# Patient Record
Sex: MALE | Race: BLACK OR AFRICAN AMERICAN | NOT HISPANIC OR LATINO | Employment: FULL TIME | URBAN - METROPOLITAN AREA
[De-identification: names, ages, dates, MRNs, and addresses within clinical notes are randomized per-mention and may not be internally consistent; named-entity substitution may affect disease eponyms.]

---

## 2023-09-30 PROBLEM — M54.81 OCCIPITAL NEURITIS: Status: ACTIVE | Noted: 2023-09-30

## 2023-09-30 PROBLEM — M99.03 SEGMENTAL AND SOMATIC DYSFUNCTION OF LUMBAR REGION: Status: ACTIVE | Noted: 2023-09-30

## 2023-09-30 PROBLEM — M54.2 NECK PAIN, CHRONIC: Status: ACTIVE | Noted: 2023-09-30

## 2023-09-30 PROBLEM — M99.02 SEGMENTAL AND SOMATIC DYSFUNCTION OF THORACIC REGION: Status: ACTIVE | Noted: 2023-09-30

## 2023-09-30 PROBLEM — M54.41 RIGHT-SIDED LOW BACK PAIN WITH SCIATICA: Status: ACTIVE | Noted: 2023-09-30

## 2023-09-30 PROBLEM — M99.05 SEGMENTAL AND SOMATIC DYSFUNCTION OF PELVIC REGION: Status: ACTIVE | Noted: 2023-09-30

## 2023-09-30 PROBLEM — J32.4 CHRONIC PANSINUSITIS: Status: ACTIVE | Noted: 2023-09-30

## 2023-09-30 PROBLEM — G25.89 SCAPULAR DYSKINESIS: Status: ACTIVE | Noted: 2023-09-30

## 2023-09-30 PROBLEM — G89.29 NECK PAIN, CHRONIC: Status: ACTIVE | Noted: 2023-09-30

## 2023-09-30 PROBLEM — M22.2X1 PATELLOFEMORAL PAIN SYNDROME OF RIGHT KNEE: Status: ACTIVE | Noted: 2023-09-30

## 2023-09-30 PROBLEM — R20.2 TINGLING OF FACE: Status: ACTIVE | Noted: 2023-09-30

## 2023-09-30 PROBLEM — J30.89 ENVIRONMENTAL AND SEASONAL ALLERGIES: Status: ACTIVE | Noted: 2023-09-30

## 2023-09-30 PROBLEM — R20.2 TINGLING SENSATION: Status: ACTIVE | Noted: 2023-09-30

## 2023-09-30 RX ORDER — LORATADINE 10 MG/1
10 TABLET ORAL DAILY
COMMUNITY

## 2023-09-30 RX ORDER — DICLOFENAC SODIUM 10 MG/G
GEL TOPICAL
COMMUNITY

## 2023-09-30 RX ORDER — TOPIRAMATE 25 MG/1
TABLET ORAL
COMMUNITY

## 2023-09-30 RX ORDER — MELOXICAM 7.5 MG/1
7.5 TABLET ORAL DAILY
COMMUNITY

## 2023-10-04 ENCOUNTER — APPOINTMENT (OUTPATIENT)
Dept: INTEGRATIVE MEDICINE | Facility: CLINIC | Age: 28
End: 2023-10-04
Payer: COMMERCIAL

## 2023-10-05 ENCOUNTER — APPOINTMENT (OUTPATIENT)
Dept: PHYSICAL THERAPY | Facility: HOSPITAL | Age: 28
End: 2023-10-05
Payer: COMMERCIAL

## 2023-10-11 ENCOUNTER — APPOINTMENT (OUTPATIENT)
Dept: INTEGRATIVE MEDICINE | Facility: CLINIC | Age: 28
End: 2023-10-11
Payer: COMMERCIAL

## 2023-10-16 ENCOUNTER — APPOINTMENT (OUTPATIENT)
Dept: PHYSICAL THERAPY | Facility: HOSPITAL | Age: 28
End: 2023-10-16
Payer: COMMERCIAL

## 2023-10-16 NOTE — PROGRESS NOTES
Physical Therapy  Physical Therapy Treatment Note    Patient Name: Patrick Rey  MRN: [id number]  Today's Date: 10/16/2023       Insurance:  Visit number: 6 of 8  Authorization info: 10/17/23  Insurance Type: 20 visit limit, very hard to get additional visits authorized    General:  Reason for visit: Patrick is a 28yr old M presenting to the clinic with R knee pain  Referred by: Dr. Morgan    Current Problem  1. Patellofemoral pain syndrome of right knee            Precautions: None      Subjective:     Patient reports ***    Pain       Performing HEP?: Yes      Objective:   AROM: Knee  L 0-130 degrees  R 0-125 degrees     AROM: Hip  Flexion WNL B  IR WNL B  ER WNL B      Flexibility: (L/R)  Hamstring 90/90 (35) / (40)  Gastroc 0 / (5)     Strength: (L/R)  Rectus femoris 5/5 / 5/5  Psoas 4-5 / 4-5  IR 5/5 / 5/5  ER 5/5 / 4+/5  Iliopsoas 5/5 / 4+/5  TFL 4+/5 / 5/5  Glut min 4+/5 / 4+/5  Glut med 4+/5 / 4/5  Glut max 5/5 / 5/5  Hamstrings 5/5 / 5/5      Treatment Performed:    Therapeutic Exercise:    *** min  ***    Manual Therapy:    *** min  ***    Neuromuscular Re-education:  *** min  ***    Other:     *** min  ***      Assessment:   ***      Plan:  ***      Elida Alvarenga, PT

## 2023-10-18 ENCOUNTER — APPOINTMENT (OUTPATIENT)
Dept: INTEGRATIVE MEDICINE | Facility: CLINIC | Age: 28
End: 2023-10-18
Payer: COMMERCIAL

## 2023-10-23 ENCOUNTER — APPOINTMENT (OUTPATIENT)
Dept: INTEGRATIVE MEDICINE | Facility: CLINIC | Age: 28
End: 2023-10-23
Payer: COMMERCIAL

## 2023-10-25 ENCOUNTER — APPOINTMENT (OUTPATIENT)
Dept: PHYSICAL THERAPY | Facility: HOSPITAL | Age: 28
End: 2023-10-25
Payer: COMMERCIAL

## 2023-10-25 ENCOUNTER — ALLIED HEALTH (OUTPATIENT)
Dept: INTEGRATIVE MEDICINE | Facility: CLINIC | Age: 28
End: 2023-10-25
Payer: COMMERCIAL

## 2023-10-25 DIAGNOSIS — G89.29 CHRONIC THORACIC BACK PAIN, UNSPECIFIED BACK PAIN LATERALITY: ICD-10-CM

## 2023-10-25 DIAGNOSIS — S16.1XXA STRAIN OF CERVICAL PORTION OF RIGHT TRAPEZIUS MUSCLE: ICD-10-CM

## 2023-10-25 DIAGNOSIS — G25.89 SCAPULAR DYSKINESIS: ICD-10-CM

## 2023-10-25 DIAGNOSIS — M99.03 SEGMENTAL AND SOMATIC DYSFUNCTION OF LUMBAR REGION: ICD-10-CM

## 2023-10-25 DIAGNOSIS — M54.6 CHRONIC THORACIC BACK PAIN, UNSPECIFIED BACK PAIN LATERALITY: ICD-10-CM

## 2023-10-25 DIAGNOSIS — M99.05 SEGMENTAL AND SOMATIC DYSFUNCTION OF PELVIC REGION: ICD-10-CM

## 2023-10-25 DIAGNOSIS — M99.02 SEGMENTAL AND SOMATIC DYSFUNCTION OF THORACIC REGION: Primary | ICD-10-CM

## 2023-10-25 PROCEDURE — 98941 CHIROPRACT MANJ 3-4 REGIONS: CPT | Performed by: CHIROPRACTOR

## 2023-10-25 NOTE — PROGRESS NOTES
Subjective   Patient ID: Patrick Rey is a 28 y.o. male who presents October 25, 2023 for chiropractic care.    7/12 VPCY    Today, the patient rates their degree of pain as a 4 out of 10 on the numeric pain rating scale.     HPI : Patrick returns to my office for care of left mid/lower back tension and right upper back discomfort. He denies new trauma/incident. Reports he was sick for a few weeks recently but is feeling better overall. Has not been able to work out as frequently as a result. He reports feeling discomfort and tension in the region of the right upper trapezius and shoulder blade. He notes discomfort along the left thoracolumbar region. Feels knee discomfort has improved overall with physical therapy. Denies new trauma/incident.      Objective   Physical Exam  Neurological:      General: No focal deficit present.      Mental Status: He is alert and oriented to person, place, and time.      Cranial Nerves: No dysarthria or facial asymmetry.      Sensory: Sensation is intact.      Motor: Motor function is intact.      Coordination: Coordination is intact.      Gait: Gait is intact.         Palpation of the following region(s) revealed:  Cervical: Upper trapezius right, hypertonicity and tenderness.  Levator scapulae right, hypertonicity and tenderness.  Thoracic: Thoracic paraspinals left, hypertonicity and tenderness.  Middle trapezius right, muscular hypertonicity.  Latissimus dorsi left, muscular hypertonicity.  Lumbar: Lumbar paraspinals bilateral, muscular hypertonicity.  Quadratus lumborum bilateral, muscular hypertonicity.        Segmental Joint(s): Segmental joint dysfunction was assessed with motion palpation and is identified in the following areas:  Thoracic : T2., T3, T7, T11, and T12  Lumbopelvic / Sacral SIJ : L1, L5/S1, and R SIJ      Assessment/Plan   Today's Treatment Included: Chiropractic manipulation to the  Segmental Joint(s) Lumbopelvic/Sacral SIJ : L1, L5/S1, and R SIJ  Segmental Joint(s) Thoracic : T2., T3, T7, T11, and T12   Treatment Techniques Used : Activator/Tool assisted technique and Pelvic drop table technique  IASTM/Graston: R upper trap, levator; L thoracolumbar PS  IDN: R upper trap (1 needle in/1 out)    Soft-tissue mobilization was performed in the following areas:   Upper Trapezius right and Levator Scap. right  Thoracic Paraspinal mm. left, Middle Trapezius right, and Rhomboids right  Lumbar Paraspinal mm. bilateral and Quadratus Lumborum bilateral            F/U in 1 month    The patient tolerated today's treatment with little or no additional discomfort and was instructed to contact the office for questions or concerns.

## 2023-11-14 NOTE — PROGRESS NOTES
Subjective   Patient ID: Patrick Rey is a 28 y.o. male who presents November 15, 2023 for chiropractic care.    7/12 VPCY    Today, the patient rates their degree of pain as a 5 out of 10 on the numeric pain rating scale.     HPI : Patrick returns to my office for care of mid/lower back tension and upper back discomfort. He denies new trauma/incident. Reports that he was feeling increased tightness throughout the thoracolumbar region last week. 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 States that during the wedding he felt abrupt tightening and discomfort along the midline of the thoracolumbar region. States that the discomfort lasted for a period of time and then gradually improved. He also reports tightness in the bilateral upper trapezius regions. No radicular complaints reported.       Objective   Physical Exam  Neurological:      General: No focal deficit present.      Mental Status: He is alert and oriented to person, place, and time.      Cranial Nerves: No dysarthria or facial asymmetry.      Sensory: Sensation is intact.      Motor: Motor function is intact.      Coordination: Coordination is intact.      Gait: Gait is intact.         Palpation of the following region(s) revealed:  Cervical: Upper trapezius right, hypertonicity and tenderness.  Levator scapulae right, hypertonicity and tenderness.  Thoracic: Thoracic paraspinals left, hypertonicity and tenderness.  Middle trapezius right, muscular hypertonicity.  Latissimus dorsi left, muscular hypertonicity.  Lumbar: Lumbar paraspinals bilateral, muscular hypertonicity.  Quadratus lumborum bilateral, muscular hypertonicity.        Segmental Joint(s): Segmental joint dysfunction was assessed with motion palpation and is identified in the following areas:  Thoracic : T6, T7, T10, T11, and T12  Lumbopelvic / Sacral SIJ : L1, L5/S1, and R SIJ      Assessment/Plan   Today's Treatment Included:  Chiropractic manipulation to the  Segmental Joint(s) Lumbopelvic/Sacral SIJ : L1, L5, and L5/S1 Segmental Joint(s) Thoracic : T6, T7, T10, T11, and T12   Treatment Techniques Used : Activator/Tool assisted technique and Pelvic drop table technique  IASTM/Graston: BL upper trap, levator; BL thoracolumbar PS  IDN: BL upper trap, T11-L1 PS (6 needle in/6 out)    Soft-tissue mobilization was performed in the following areas:   Upper Trapezius right and Levator Scap. right  Thoracic Paraspinal mm. left, Middle Trapezius right, and Rhomboids right  Lumbar Paraspinal mm. bilateral and Quadratus Lumborum bilateral  IC to thoracolumbar PS      F/U in 1 month    The patient tolerated today's treatment with little or no additional discomfort and was instructed to contact the office for questions or concerns.

## 2023-11-15 ENCOUNTER — ALLIED HEALTH (OUTPATIENT)
Dept: INTEGRATIVE MEDICINE | Facility: CLINIC | Age: 28
End: 2023-11-15
Payer: COMMERCIAL

## 2023-11-15 DIAGNOSIS — G89.29 CHRONIC MIDLINE THORACIC BACK PAIN: ICD-10-CM

## 2023-11-15 DIAGNOSIS — G25.89 SCAPULAR DYSKINESIS: ICD-10-CM

## 2023-11-15 DIAGNOSIS — M54.6 CHRONIC MIDLINE THORACIC BACK PAIN: ICD-10-CM

## 2023-11-15 DIAGNOSIS — M99.05 SEGMENTAL AND SOMATIC DYSFUNCTION OF PELVIC REGION: ICD-10-CM

## 2023-11-15 DIAGNOSIS — M99.02 SEGMENTAL AND SOMATIC DYSFUNCTION OF THORACIC REGION: Primary | ICD-10-CM

## 2023-11-15 DIAGNOSIS — M99.03 SEGMENTAL AND SOMATIC DYSFUNCTION OF LUMBAR REGION: ICD-10-CM

## 2023-11-15 PROCEDURE — 98941 CHIROPRACT MANJ 3-4 REGIONS: CPT | Performed by: CHIROPRACTOR

## 2023-11-28 ENCOUNTER — DOCUMENTATION (OUTPATIENT)
Dept: INTEGRATIVE MEDICINE | Facility: CLINIC | Age: 28
End: 2023-11-28
Payer: COMMERCIAL

## 2023-11-28 ENCOUNTER — TELEPHONE (OUTPATIENT)
Dept: INTEGRATIVE MEDICINE | Facility: CLINIC | Age: 28
End: 2023-11-28
Payer: COMMERCIAL

## 2023-11-28 DIAGNOSIS — R20.2 TINGLING SENSATION: Primary | ICD-10-CM

## 2023-11-28 DIAGNOSIS — M54.81 OCCIPITAL NEURITIS: ICD-10-CM

## 2023-11-28 NOTE — TELEPHONE ENCOUNTER
"Patrick contacted me this morning via email for orthopedics referral. I called the patient and we spoke over the phone at about 10:30 am. He states that 4 days ago he was seen by his outside chiropractor and states they tried a new therapy called \"RPM - volt treatment\". He reports that this therapy felt similar to ultrasound (with use of gel) but also with a shock-like sensation. They started the therapy first along his trapezius region and then moved up toward the base of the skull. He asked them to turn down the intensity due to the sensation. He reports that after this therapy, he felt weird. Notes experiencing tingling in the hands and feet and felt his neck was \"floating\". This has persisted over the past 4 days. Patient looking to be evaluated through orthopedics due to his history of C1-2 ACDF at 7 years old. I am in agreement and the referral has been placed.  "

## 2023-11-29 ENCOUNTER — OFFICE VISIT (OUTPATIENT)
Dept: ORTHOPEDIC SURGERY | Facility: CLINIC | Age: 28
End: 2023-11-29
Payer: COMMERCIAL

## 2023-11-29 ENCOUNTER — ANCILLARY PROCEDURE (OUTPATIENT)
Dept: RADIOLOGY | Facility: CLINIC | Age: 28
End: 2023-11-29
Payer: COMMERCIAL

## 2023-11-29 DIAGNOSIS — S13.9XXA CERVICAL SPRAIN: Primary | ICD-10-CM

## 2023-11-29 DIAGNOSIS — S13.9XXA CERVICAL SPRAIN: ICD-10-CM

## 2023-11-29 PROCEDURE — 72050 X-RAY EXAM NECK SPINE 4/5VWS: CPT | Performed by: RADIOLOGY

## 2023-11-29 PROCEDURE — 72050 X-RAY EXAM NECK SPINE 4/5VWS: CPT | Mod: FY

## 2023-11-29 PROCEDURE — 99203 OFFICE O/P NEW LOW 30 MIN: CPT | Performed by: PHYSICIAN ASSISTANT

## 2023-11-29 NOTE — PROGRESS NOTES
Patrick is a 28-year-old male that presents today to be evaluated for his acute onset of axial pain.  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    Patient states that he went to a chiropractor down in [place] at [facility] and normally he goes in for realignment's and treatment but there was a student or assistant that was working that day and she used a RPM gun and went right at the site of his previous cervical fusion.  He states since then he has had very adverse side effects.  He feels very off, radicular symptoms both in the cervical and lumbar spine.  This visit was on a Friday.  He then drove this past Saturday to [place] to visit family and the symptoms are still concerning that then he went to the emergency room while he was there.  While he was there they did obtain x-rays and reassured him that nothing was wrong, there was no hardware malfunction no compression fractures and no instability.  They prescribed him prednisone and a muscle relaxer and follow-up with orthopedics.    Once the patient returned back to [facility] he was hoping that symptoms would get better but they were still pretty persistent.  Today he states that he is still dealing with axial neck pain specifically up at the C2-C3 level.  He has very sporadic radiculopathy with numbness and tingling predominantly throughout the bilateral area.  He is ambulating without any assistance or difficulty.  Full control of his bowel and bladder.    From a treatment standpoint he is only been taking the prednisone and muscle relaxer.  No formal physical therapy but he does follow-up with the chiropractic office on a regular basis as well as [facility] for massages.  He is scheduled to see both of them in the next week or so.      We did review x-rays that he had taken today.  As well as compared them to x-rays that he had done at the earlier portion of the year.  The results are extremely similar.  No  malalignment no failure no concerning findings.    I encouraged the patient to be patient with this.  This is going to take some time.  I encouraged anti-inflammatories and Tylenol.  He denies as well as continuing with the stretching and exercise program.  If symptoms do not improve in the next 1-2 weeks he will call the office.     This note was dictated using speech recognition software and was not corrected for spelling or grammatical errors

## 2023-12-05 NOTE — PROGRESS NOTES
"Subjective   Patient ID: Patrick Rey is a 28 y.o. male who presents December 6, 2023 for chiropractic care.    8/12 VPCY    Today, the patient rates their degree of pain as a 6 out of 10 on the numeric pain rating scale.     HPI : Patrick returns to my office for care of recurrent mid/lower back tension along with new onset of neck discomfort and altered sensation. He states that new onset symptoms began about 1.5 weeks ago after going to his other chiropractic office in [place] for care.  He reports that an assistant or student used an RPM device that felt similar to both ultrasound with a \"volt\" sensation. He states that this was helpful in his trapezius region but they then moved up toward the base of the skull and previous cervical surgical site. He reports feeling that he could not speak for a short moment during this treatment. Since then, he has been experiencing adverse effects included altered sensation in the hands and feet. He reports discomfort along the base of the skull and feels his head is \"floating\". He has been able to continue to work and move without difficulty, but feels \"off\". No changes in bowel/bladder function. Was seen by orthopedics on 11/29 and x-rays were reassuring. No other changes in health noted.    Objective   Physical Exam  Neurological:      General: No focal deficit present.      Mental Status: He is alert and oriented to person, place, and time.      Cranial Nerves: No dysarthria or facial asymmetry.      Sensory: Sensation is intact.      Motor: Motor function is intact.      Coordination: Coordination is intact.      Gait: Gait is intact.         Palpation of the following region(s) revealed:  Cervical: Suboccipitals bilateral, hypertonicity and tenderness.  Upper trapezius bilateral, hypertonicity and tenderness.  Levator scapulae bilateral, muscular hypertonicity.  Thoracic: Thoracic paraspinals left, muscular hypertonicity.  Middle trapezius bilateral, muscular " hypertonicity.  Latissimus dorsi bilateral, muscular hypertonicity.  Lumbar: Lumbar paraspinals bilateral, muscular hypertonicity.  Quadratus lumborum bilateral, muscular hypertonicity.        Segmental Joint(s): Segmental joint dysfunction was assessed with motion palpation and is identified in the following areas:  Thoracic : T4, T5, T10, T11, and T12  Lumbopelvic / Sacral SIJ : L1, L5, and R SIJ      Assessment/Plan   Today's Treatment Included: Chiropractic manipulation to the  Segmental Joint(s) Lumbopelvic/Sacral SIJ : L1, L4, L5/S1, and R SIJ Segmental Joint(s) Thoracic : T4, T5, T10, T11, and T12   Treatment Techniques Used : Activator/Tool assisted technique, Pelvic drop table technique, and Low force  Neuromuscular Re-Education (47240): Start time: 11:20 am End time: 11:35 am  1 Units  IASTM/Graston: BL thoracolumbar PS    Soft-tissue mobilization was performed in the following areas:   Upper Trapezius right and Levator Scap. right  Thoracic Paraspinal mm. left, Middle Trapezius right, and Rhomboids right  Lumbar Paraspinal mm. bilateral and Quadratus Lumborum bilateral  Additional STM: pec major/minor, suboccipitals  IC to thoracolumbar PS      F/U in 1 month    The patient tolerated today's treatment with little or no additional discomfort and was instructed to contact the office for questions or concerns.

## 2023-12-06 ENCOUNTER — ALLIED HEALTH (OUTPATIENT)
Dept: INTEGRATIVE MEDICINE | Facility: CLINIC | Age: 28
End: 2023-12-06
Payer: COMMERCIAL

## 2023-12-06 DIAGNOSIS — G89.29 CHRONIC MIDLINE THORACIC BACK PAIN: ICD-10-CM

## 2023-12-06 DIAGNOSIS — M99.02 SEGMENTAL AND SOMATIC DYSFUNCTION OF THORACIC REGION: ICD-10-CM

## 2023-12-06 DIAGNOSIS — M99.03 SEGMENTAL AND SOMATIC DYSFUNCTION OF LUMBAR REGION: ICD-10-CM

## 2023-12-06 DIAGNOSIS — M54.6 CHRONIC MIDLINE THORACIC BACK PAIN: ICD-10-CM

## 2023-12-06 DIAGNOSIS — M99.05 SEGMENTAL AND SOMATIC DYSFUNCTION OF PELVIC REGION: ICD-10-CM

## 2023-12-06 DIAGNOSIS — R20.2 TINGLING SENSATION: Primary | ICD-10-CM

## 2023-12-06 DIAGNOSIS — S13.9XXA NECK SPRAIN, INITIAL ENCOUNTER: ICD-10-CM

## 2023-12-06 PROCEDURE — 98941 CHIROPRACT MANJ 3-4 REGIONS: CPT | Performed by: CHIROPRACTOR

## 2023-12-14 ENCOUNTER — OFFICE VISIT (OUTPATIENT)
Dept: ORTHOPEDIC SURGERY | Facility: HOSPITAL | Age: 28
End: 2023-12-14
Payer: COMMERCIAL

## 2023-12-14 VITALS — HEIGHT: 71 IN | WEIGHT: 165 LBS | BODY MASS INDEX: 23.1 KG/M2

## 2023-12-14 DIAGNOSIS — M54.81 OCCIPITAL NEURITIS: ICD-10-CM

## 2023-12-14 DIAGNOSIS — Z98.1 HISTORY OF FUSION OF CERVICAL SPINE: Primary | ICD-10-CM

## 2023-12-14 DIAGNOSIS — R20.2 TINGLING SENSATION: ICD-10-CM

## 2023-12-14 PROCEDURE — 99214 OFFICE O/P EST MOD 30 MIN: CPT | Performed by: ORTHOPAEDIC SURGERY

## 2023-12-14 NOTE — PROGRESS NOTES
Chief Complaint: Neck pain and paresthesias    HPI: Patrick Rey is a 28 y.o. 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  He states that about 4 years ago he has these chronic symptoms of paresthesias that radiates from the back of his head to his face and into his hands.  Over time with chiropractic treatments and therapy it has improved.  Currently his paresthesias are mainly in his hand along the ulnar aspect of his hand bilaterally they come and go.  He was doing fine up until recently about 3 weeks ago during chiropractic treatment he had some kind of treatment where he had electric shock on the back of his neck which cause worsening of his paresthesias.  No bowel or bladder disturbances or saddle anesthesia.  No extremity weakness.  The paresthesias seems to be slightly improving but still there.  He is undergone chiropractic treatments as well as massage therapy.      No anticoagulations  No tobacco use  He is a  employee and works in marketing.    Review of Systems    All other systems have been reviewed and are negative for complaint. All pertinent positive and negative as listed in history of present illness.    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     Spondylometaphyseal dysplasia    Personal history of other diseases of the musculoskeletal system and connective tissue     History of bone disorder        Past Surgical History:   Procedure Laterality Date    CT GUIDED PERCUTANEOUS BIOPSY BONE  10/27/2021    CT GUIDED PERCUTANEOUS BIOPSY BONE 10/27/2021        Allergies   Allergen Reactions    Ragweed Unknown        Current Outpatient Medications on File Prior to Visit   Medication Sig Dispense Refill    diclofenac sodium  (Arthritis Pain, diclofenac,) 1 % gel gel Apply sparingly to affected areas daily as needed      lidocaine 4 % kit Apply 1 patch topically once daily.      loratadine (Claritin) 10 mg tablet Take 1 tablet (10 mg) by mouth once daily.      meloxicam (Mobic) 7.5 mg tablet Take 1 tablet (7.5 mg) by mouth once daily. With food      topiramate (Topamax) 25 mg tablet Take per titration, fax to pharmacy       No current facility-administered medications on file prior to visit.            PE:   General: Patient appears well-nourished and well-developed in no acute distress, Alert and Oriented x3  Psych: Pleasant mood and affect  HEENT: Extraocular muscles intact, pupils equal and round. Sclerae anicteric   Cardio: extremities warm and well perfused  Resp: unlabored symmetric breathing  Skin: He has a well-healed posterior cervical incision midline  Musculoskeletal/Neuro Exam: Normal gait.  No tenderness to palpation along the cervical spine.  He actually has pretty good cervical range of motion despite the fusion.  Negative Spurlings.  Negative Lhermitte's Sign    Upper extremity: Negative Tinel over the cubital tunnel bilaterally.  Negative Concepcion and Durkan over the carpal tunnel bilaterally    Motor: Right upper extremity was 5 out of 5 strength with shoulder abduction, elbow flexion and extension, wrist flexion and extension and  against resistance  Left upper extremity with 5 out of 5 strength with shoulder abduction, elbow flexion and extension, wrist flexion and extension and  against resistance    Sensation to light touch intact along C5-T1 distribution bilaterally    Reflex: 2+ brachioradialis and biceps bilaterally    Upper Motor Signs: Negative Ermias sign bilaterally    Lower extremity    Motor: Right leg with 5 out of 5 motor strength with hip flexion, knee extension, ankle dorsiflexion plantarflexion EHL against resistance  Left leg with 5 out of 5 motor strength with hip flexion, knee extension,  ankle dorsiflexion plantarflexion EHL against resistance    Sensation to light touch intact along L2-S1 distribution bilaterally    2+ patella and Achilles Reflex          Imaging:    I reviewed x-rays of the cervical spine from November 29, 2023.  AP lateral flexion-extension views.  He has what looks like sublaminar wiring in his posterior C2 elements.  No instability with flexion extension no acute fractures.          A/P: Patrick Rey is a 28 y.o. year old male patient with history of posterior cervical fusion however on x-ray only sees sublaminar wiring in the posterior elements of C2.  He has done fine since his surgery but has been having symptoms of paresthesias in the back of his head and face for the past 4 years that slowly improving with therapy.  However more recently about 3 weeks ago during chiropractic treatment he had some kind of treatment where he felt like he was tased.  He developed worsening paresthesias in his hands mainly along the ulnar aspect as well as neck pain.  Given his continued neck pain despite conservative treatment of chiropractic treatment massage therapy I would like for him to get a CT scan of the cervical spine to assess his fusion and instrumentation.  If he continues to have the paresthesias in his hands at the next visit we can get a EMG.  He was advised to schedule appointment see me back after the CT scan is complete. After our discussion, the patient articulated understanding of the plan and felt that all questions had been answered satisfactorily. The patient was pleased with the visit and very appreciative for the care rendered.    **Please excuse any errors in grammar or translation related to this dictation. Voice recognition software was utilized to prepare this document. **                    Carina Mortensen MD    Department of Orthopaedic Surgery  [facility]  [email or web address]".[facility]

## 2023-12-18 ENCOUNTER — APPOINTMENT (OUTPATIENT)
Dept: ORTHOPEDIC SURGERY | Facility: CLINIC | Age: 28
End: 2023-12-18
Payer: COMMERCIAL

## 2023-12-20 ENCOUNTER — ALLIED HEALTH (OUTPATIENT)
Dept: INTEGRATIVE MEDICINE | Facility: CLINIC | Age: 28
End: 2023-12-20
Payer: COMMERCIAL

## 2023-12-20 DIAGNOSIS — M54.6 CHRONIC MIDLINE THORACIC BACK PAIN: ICD-10-CM

## 2023-12-20 DIAGNOSIS — M99.02 SEGMENTAL AND SOMATIC DYSFUNCTION OF THORACIC REGION: Primary | ICD-10-CM

## 2023-12-20 DIAGNOSIS — M99.03 SEGMENTAL AND SOMATIC DYSFUNCTION OF LUMBAR REGION: ICD-10-CM

## 2023-12-20 DIAGNOSIS — G89.29 CHRONIC MIDLINE THORACIC BACK PAIN: ICD-10-CM

## 2023-12-20 DIAGNOSIS — M99.05 SEGMENTAL AND SOMATIC DYSFUNCTION OF PELVIC REGION: ICD-10-CM

## 2023-12-20 PROCEDURE — 98941 CHIROPRACT MANJ 3-4 REGIONS: CPT | Performed by: CHIROPRACTOR

## 2023-12-20 NOTE — PROGRESS NOTES
Acupuncture Visit:     Subjective   Patient ID: Patrick Rey is a 28 y.o. male who presents for No chief complaint on file.  HPI              Review of Systems         Provider reviewed plan for the acupuncture session, precautions and contraindications. Patient/guardian/hospital staff has given consent to treat with full understanding of what to expect during the session. Before acupuncture began, provider explained to the patient to communicate at any time if the procedure was causing discomfort past their tolerance level. Patient agreed to advise acupuncturist. The acupuncturist counseled the patient on the risks of acupuncture treatment including pain, infection, bleeding, and no relief of pain. The patient was positioned comfortably. There was no evidence of infection at the site of needle insertions.    Objective   Physical Exam                             Assessment/Plan   {Assess/PlanSmartLinks:64589}

## 2023-12-20 NOTE — PROGRESS NOTES
Subjective   Patient ID: Patrick Rey is a 28 y.o. male who presents December 20, 2023 for chiropractic care.    9/12 VPCY    Today, the patient rates their degree of pain as a 3 out of 10 on the numeric pain rating scale.     HPI : Patrick returns to my office for care of recurrent mid/lower back tension along with neck tension. Since the time of his last visit he was evaluated by orthopedics following cervical symptoms began after going to his other chiropractic office in [place] for care including tingling and stiffness. A cervical CT order was placed which he will be completing in early January. He does report overall improvement in symptoms since his last visit. He does report continued stiffness along the base of the neck, particularly when rotating the head when sleeping. 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 No other changes in health noted.    Objective   Physical Exam  Neurological:      General: No focal deficit present.      Mental Status: He is alert and oriented to person, place, and time.      Cranial Nerves: No dysarthria or facial asymmetry.      Sensory: Sensation is intact.      Motor: Motor function is intact.      Coordination: Coordination is intact.      Gait: Gait is intact.         Palpation of the following region(s) revealed:  Cervical: Suboccipitals bilateral, hypertonicity and tenderness.  Upper trapezius bilateral, muscular hypertonicity.  Levator scapulae bilateral, muscular hypertonicity.  Thoracic: Thoracic paraspinals left, muscular hypertonicity.  Middle trapezius bilateral, muscular hypertonicity.  Latissimus dorsi bilateral, muscular hypertonicity.  Lumbar: Lumbar paraspinals bilateral, muscular hypertonicity.  Quadratus lumborum bilateral, muscular hypertonicity.        Segmental Joint(s): Segmental joint dysfunction was assessed with motion palpation and is identified in the following areas:  Thoracic :  T4, T5, T10, T11, and T12  Lumbopelvic / Sacral SIJ : L1, L5, and R SIJ      Assessment/Plan   Today's Treatment Included: Chiropractic manipulation to the  Segmental Joint(s) Lumbopelvic/Sacral SIJ : L1, L4, L5/S1, and R SIJ Segmental Joint(s) Thoracic : T4, T5, T10, T11, and T12   Treatment Techniques Used : Activator/Tool assisted technique, Pelvic drop table technique, and Low force  Neuromuscular Re-Education (36953): Start time: 12:20 am End time: 12:35 am  1 Units  IASTM/Graston: BL thoracolumbar PS    Soft-tissue mobilization was performed in the following areas:   Cervical paraspinal mm. bilateral, Upper Trapezius right, and Levator Scap. bilateral  Thoracic Paraspinal mm. bilateral, Middle Trapezius bilateral, and Rhomboids bilateral  Lumbar Paraspinal mm. bilateral and Quadratus Lumborum bilateral  Additional STM: pec major/minor, suboccipitals    IC to thoracolumbar PS      F/U in 1 month    The patient tolerated today's treatment with little or no additional discomfort and was instructed to contact the office for questions or concerns.

## 2024-01-05 ENCOUNTER — HOSPITAL ENCOUNTER (OUTPATIENT)
Dept: RADIOLOGY | Facility: HOSPITAL | Age: 29
Discharge: HOME | End: 2024-01-05
Payer: COMMERCIAL

## 2024-01-05 DIAGNOSIS — M54.81 OCCIPITAL NEURITIS: ICD-10-CM

## 2024-01-05 DIAGNOSIS — Z98.1 HISTORY OF FUSION OF CERVICAL SPINE: ICD-10-CM

## 2024-01-05 PROCEDURE — 72125 CT NECK SPINE W/O DYE: CPT

## 2024-01-05 PROCEDURE — 72125 CT NECK SPINE W/O DYE: CPT | Performed by: RADIOLOGY

## 2024-01-09 ENCOUNTER — APPOINTMENT (OUTPATIENT)
Dept: INTEGRATIVE MEDICINE | Facility: CLINIC | Age: 29
End: 2024-01-09
Payer: COMMERCIAL

## 2024-01-23 ENCOUNTER — APPOINTMENT (OUTPATIENT)
Dept: INTEGRATIVE MEDICINE | Facility: CLINIC | Age: 29
End: 2024-01-23
Payer: COMMERCIAL

## 2024-01-23 ENCOUNTER — ALLIED HEALTH (OUTPATIENT)
Dept: INTEGRATIVE MEDICINE | Facility: CLINIC | Age: 29
End: 2024-01-23
Payer: COMMERCIAL

## 2024-01-23 DIAGNOSIS — G89.29 CHRONIC MIDLINE THORACIC BACK PAIN: ICD-10-CM

## 2024-01-23 DIAGNOSIS — M54.2 NECK PAIN, CHRONIC: ICD-10-CM

## 2024-01-23 DIAGNOSIS — M99.03 SEGMENTAL AND SOMATIC DYSFUNCTION OF LUMBAR REGION: ICD-10-CM

## 2024-01-23 DIAGNOSIS — M99.02 SEGMENTAL AND SOMATIC DYSFUNCTION OF THORACIC REGION: Primary | ICD-10-CM

## 2024-01-23 DIAGNOSIS — M54.6 CHRONIC MIDLINE THORACIC BACK PAIN: ICD-10-CM

## 2024-01-23 DIAGNOSIS — M99.05 SEGMENTAL AND SOMATIC DYSFUNCTION OF PELVIC REGION: ICD-10-CM

## 2024-01-23 DIAGNOSIS — G89.29 NECK PAIN, CHRONIC: ICD-10-CM

## 2024-01-23 PROCEDURE — 97112 NEUROMUSCULAR REEDUCATION: CPT | Performed by: CHIROPRACTOR

## 2024-01-23 NOTE — PROGRESS NOTES
Subjective   Patient ID: Patrick Rey is a 28 y.o. male who presents January 23, 2024 for chiropractic care.    (0/12) [facility].  (1/20) [facility] [id number]    Today, the patient rates their degree of pain as a 3 out of 10 on the numeric pain rating scale.     Patrick returns for continued chiropractic care. He states that he has been doing well. Today, he presents with some discomfort in the neck and shoulders due to work. However, he states that his low back has been feeling great! The patient denies any changes in health since his last encounter and will return in 1 month.      Objective   Physical Exam  Neurological:      General: No focal deficit present.      Mental Status: He is alert and oriented to person, place, and time.      Cranial Nerves: No dysarthria or facial asymmetry.      Sensory: Sensation is intact.      Motor: Motor function is intact.      Coordination: Coordination is intact.      Gait: Gait is intact.       Palpation of the following region(s) revealed:  Cervical: Scalenes bilateral, muscular hypertonicity.  Upper trapezius bilateral, muscular hypertonicity.  Levator scapulae bilateral, muscular hypertonicity.  Cervical paraspinals bilateral, muscular hypertonicity.  Thoracic: Thoracic paraspinals bilateral, muscular hypertonicity.  Rhomboids bilateral, muscular hypertonicity.  Pectoralis bilateral, muscular hypertonicity.  Latissimus dorsi bilateral, muscular hypertonicity.  Lumbar: Lumbar paraspinals bilateral, muscular hypertonicity.  Gluteal bilateral, muscular hypertonicity.              Assessment/Plan   Today's Treatment Included: Neuromuscular Re-Education (66129): Start time: 9:45 am End time: 10:15 am  2 Units  Pin and stretch    Soft-tissue mobilization was performed in the following areas:   Suboccipital bilateral, Cervical paraspinal mm. bilateral, Scalenes bilateral, Upper Trapezius bilateral, and Levator Scap. bilateral  Thoracic Paraspinal mm. bilateral, Middle Trapezius  bilateral, Rhomboids bilateral, Pectoralis bilateral, Subscapularis bilateral, and Latissimus Dorsi bilateral  Lumbar Paraspinal mm. bilateral, Quadratus Lumborum bilateral, Gluteal mm. Glute. Max.  and Glute. Med. bilateral, and Piriformis bilateral            The patient tolerated today's treatment with little or no additional discomfort and was instructed to contact the office for questions or concerns.

## 2024-03-04 NOTE — PROGRESS NOTES
Subjective   Patient ID: Patrick Rey is a 28 y.o. male who presents March 5, 2024 for chiropractic care.    (0/12) [facility].  (4/20) [facility] [address]    Today, the patient rates their degree of pain as a 3 out of 10 on the numeric pain rating scale.     Patrick returns for continued chiropractic care. He states that he has been doing well. Today, he presents with tension in the lower thoracic from work. The patient denies any changes in health since his last encounter and will return in 1 month.      Objective   Physical Exam  Neurological:      General: No focal deficit present.      Mental Status: He is alert and oriented to person, place, and time.      Cranial Nerves: No dysarthria or facial asymmetry.      Sensory: Sensation is intact.      Motor: Motor function is intact.      Coordination: Coordination is intact.      Gait: Gait is intact.       Palpation of the following region(s) revealed:  Cervical: Scalenes bilateral, muscular hypertonicity.  Upper trapezius bilateral, muscular hypertonicity.  Levator scapulae bilateral, muscular hypertonicity.  Cervical paraspinals bilateral, muscular hypertonicity.  Thoracic: Thoracic paraspinals bilateral, muscular hypertonicity.  Rhomboids bilateral, muscular hypertonicity.  Pectoralis bilateral, muscular hypertonicity.  Latissimus dorsi bilateral, muscular hypertonicity.  Lumbar: Lumbar paraspinals bilateral, muscular hypertonicity.  Gluteal bilateral, muscular hypertonicity.    Assessment/Plan   Today's Treatment Included: Neuromuscular Re-Education (68804): Start time: 10:10 am End time: 10:40 am  2 Units  Pin and stretch    Soft-tissue mobilization was performed in the following areas:   Suboccipital bilateral, Cervical paraspinal mm. bilateral, Scalenes bilateral, Upper Trapezius bilateral, and Levator Scap. bilateral  Thoracic Paraspinal mm. bilateral, Middle Trapezius bilateral, Rhomboids bilateral, Pectoralis bilateral, Subscapularis bilateral, and  Latissimus Dorsi bilateral  Lumbar Paraspinal mm. bilateral, Quadratus Lumborum bilateral, Gluteal mm. Glute. Max.  and Glute. Med. bilateral, and Piriformis bilateral    The patient tolerated today's treatment with little or no additional discomfort and was instructed to contact the office for questions or concerns.

## 2024-03-05 ENCOUNTER — ALLIED HEALTH (OUTPATIENT)
Dept: INTEGRATIVE MEDICINE | Facility: CLINIC | Age: 29
End: 2024-03-05
Payer: COMMERCIAL

## 2024-03-05 DIAGNOSIS — M54.6 CHRONIC MIDLINE THORACIC BACK PAIN: ICD-10-CM

## 2024-03-05 DIAGNOSIS — M99.02 SEGMENTAL AND SOMATIC DYSFUNCTION OF THORACIC REGION: Primary | ICD-10-CM

## 2024-03-05 DIAGNOSIS — G89.29 NECK PAIN, CHRONIC: ICD-10-CM

## 2024-03-05 DIAGNOSIS — M99.03 SEGMENTAL AND SOMATIC DYSFUNCTION OF LUMBAR REGION: ICD-10-CM

## 2024-03-05 DIAGNOSIS — M79.10 MYALGIA: ICD-10-CM

## 2024-03-05 DIAGNOSIS — G89.29 CHRONIC MIDLINE THORACIC BACK PAIN: ICD-10-CM

## 2024-03-05 DIAGNOSIS — M54.2 NECK PAIN, CHRONIC: ICD-10-CM

## 2024-03-05 DIAGNOSIS — M99.05 SEGMENTAL AND SOMATIC DYSFUNCTION OF PELVIC REGION: ICD-10-CM

## 2024-03-05 PROCEDURE — 97112 NEUROMUSCULAR REEDUCATION: CPT | Performed by: CHIROPRACTOR

## 2024-04-02 ENCOUNTER — ALLIED HEALTH (OUTPATIENT)
Dept: INTEGRATIVE MEDICINE | Facility: CLINIC | Age: 29
End: 2024-04-02
Payer: COMMERCIAL

## 2024-04-02 DIAGNOSIS — M54.2 NECK PAIN, CHRONIC: ICD-10-CM

## 2024-04-02 DIAGNOSIS — G89.29 CHRONIC MIDLINE THORACIC BACK PAIN: ICD-10-CM

## 2024-04-02 DIAGNOSIS — M54.6 CHRONIC MIDLINE THORACIC BACK PAIN: ICD-10-CM

## 2024-04-02 DIAGNOSIS — M99.02 SEGMENTAL AND SOMATIC DYSFUNCTION OF THORACIC REGION: Primary | ICD-10-CM

## 2024-04-02 DIAGNOSIS — M79.10 MYALGIA: ICD-10-CM

## 2024-04-02 DIAGNOSIS — G89.29 NECK PAIN, CHRONIC: ICD-10-CM

## 2024-04-02 DIAGNOSIS — M99.05 SEGMENTAL AND SOMATIC DYSFUNCTION OF PELVIC REGION: ICD-10-CM

## 2024-04-02 DIAGNOSIS — M99.03 SEGMENTAL AND SOMATIC DYSFUNCTION OF LUMBAR REGION: ICD-10-CM

## 2024-04-02 PROCEDURE — 97112 NEUROMUSCULAR REEDUCATION: CPT | Performed by: CHIROPRACTOR

## 2024-04-02 NOTE — PROGRESS NOTES
Subjective   Patient ID: Patrick Rey is a 29 y.o. male who presents April 2, 2024 for chiropractic care.    (0/12) [facility].  (5/20) [facility] [address]    Today, the patient rates their degree of pain as a 3 out of 10 on the numeric pain rating scale.     Patrick returns for continued chiropractic care. Today, he reports that he has been experiencing increased tension in the shoulders and low back. The patient denies any changes in health since his last encounter and will return in 1 month.      Objective   Physical Exam  Neurological:      General: No focal deficit present.      Mental Status: He is alert and oriented to person, place, and time.      Cranial Nerves: No dysarthria or facial asymmetry.      Sensory: Sensation is intact.      Motor: Motor function is intact.      Coordination: Coordination is intact.      Gait: Gait is intact.       Palpation of the following region(s) revealed:  Cervical: Scalenes bilateral, muscular hypertonicity.  Upper trapezius bilateral, muscular hypertonicity.  Levator scapulae bilateral, muscular hypertonicity.  Cervical paraspinals bilateral, muscular hypertonicity.  Thoracic: Thoracic paraspinals bilateral, muscular hypertonicity.  Rhomboids bilateral, muscular hypertonicity.  Pectoralis bilateral, muscular hypertonicity.  Latissimus dorsi bilateral, muscular hypertonicity.  Lumbar: Lumbar paraspinals bilateral, muscular hypertonicity.  Gluteal bilateral, muscular hypertonicity.    Assessment/Plan   Today's Treatment Included: Neuromuscular Re-Education ([id number]): Start time: 10:10 am End time: 10:40 am  2 Units  Pin and stretch  Integrative Dry Needling (IDN) - Needles in / out:  10.    Soft-tissue mobilization was performed in the following areas:   Suboccipital bilateral, Cervical paraspinal mm. bilateral, Scalenes bilateral, Upper Trapezius bilateral, and Levator Scap. bilateral  Thoracic Paraspinal mm. bilateral, Middle Trapezius bilateral, Rhomboids bilateral,  Pectoralis bilateral, Subscapularis bilateral, and Latissimus Dorsi bilateral  Lumbar Paraspinal mm. bilateral, Quadratus Lumborum bilateral, Gluteal mm. Glute. Max.  and Glute. Med. bilateral, and Piriformis bilateral    The patient tolerated today's treatment with little or no additional discomfort and was instructed to contact the office for questions or concerns.

## 2024-05-07 ENCOUNTER — APPOINTMENT (OUTPATIENT)
Dept: INTEGRATIVE MEDICINE | Facility: CLINIC | Age: 29
End: 2024-05-07
Payer: COMMERCIAL

## 2024-05-14 ENCOUNTER — APPOINTMENT (OUTPATIENT)
Dept: INTEGRATIVE MEDICINE | Facility: CLINIC | Age: 29
End: 2024-05-14
Payer: COMMERCIAL

## 2024-05-20 ENCOUNTER — ALLIED HEALTH (OUTPATIENT)
Dept: INTEGRATIVE MEDICINE | Facility: CLINIC | Age: 29
End: 2024-05-20
Payer: COMMERCIAL

## 2024-05-20 ENCOUNTER — APPOINTMENT (OUTPATIENT)
Dept: ORTHOPEDIC SURGERY | Facility: HOSPITAL | Age: 29
End: 2024-05-20
Payer: COMMERCIAL

## 2024-05-20 DIAGNOSIS — M54.6 CHRONIC MIDLINE THORACIC BACK PAIN: ICD-10-CM

## 2024-05-20 DIAGNOSIS — M79.10 MYALGIA: ICD-10-CM

## 2024-05-20 DIAGNOSIS — G89.29 NECK PAIN, CHRONIC: ICD-10-CM

## 2024-05-20 DIAGNOSIS — M99.03 SEGMENTAL AND SOMATIC DYSFUNCTION OF LUMBAR REGION: ICD-10-CM

## 2024-05-20 DIAGNOSIS — M99.02 SEGMENTAL AND SOMATIC DYSFUNCTION OF THORACIC REGION: Primary | ICD-10-CM

## 2024-05-20 DIAGNOSIS — M99.05 SEGMENTAL AND SOMATIC DYSFUNCTION OF PELVIC REGION: ICD-10-CM

## 2024-05-20 DIAGNOSIS — M54.2 NECK PAIN, CHRONIC: ICD-10-CM

## 2024-05-20 DIAGNOSIS — G89.29 CHRONIC MIDLINE THORACIC BACK PAIN: ICD-10-CM

## 2024-05-20 PROCEDURE — 97112 NEUROMUSCULAR REEDUCATION: CPT | Performed by: CHIROPRACTOR

## 2024-05-20 NOTE — PROGRESS NOTES
Subjective   Patient ID: Patrick Rey is a 29 y.o. male who presents May 20, 2024 for chiropractic care.    (0/12) [facility].  (6/20) [facility] [address]    Today, the patient rates their degree of pain as a 3 out of 10 on the numeric pain rating scale.     Patrick returns for continued chiropractic care. Today, he states that he has been experiencing increased tension in the hips after a wedding this weekend. The patient denies any changes in health since his last encounter and will return in 1 month.      Objective   Physical Exam  Neurological:      General: No focal deficit present.      Mental Status: He is alert and oriented to person, place, and time.      Cranial Nerves: No dysarthria or facial asymmetry.      Sensory: Sensation is intact.      Motor: Motor function is intact.      Coordination: Coordination is intact.      Gait: Gait is intact.     Palpation of the following region(s) revealed:  Cervical: Scalenes bilateral, muscular hypertonicity.  Upper trapezius bilateral, muscular hypertonicity.  Levator scapulae bilateral, muscular hypertonicity.  Cervical paraspinals bilateral, muscular hypertonicity.  Thoracic: Thoracic paraspinals bilateral, muscular hypertonicity.  Rhomboids bilateral, muscular hypertonicity.  Pectoralis bilateral, muscular hypertonicity.  Latissimus dorsi bilateral, muscular hypertonicity.  Lumbar: Lumbar paraspinals bilateral, muscular hypertonicity.  Gluteal bilateral, muscular hypertonicity.    Assessment/Plan   Today's Treatment Included: Neuromuscular Re-Education (87207): Start time: 3:25 pm End time: 3:40 pm  1 Units  Pin and stretch  Integrative Dry Needling (IDN) - Needles in / out:  6.    Soft-tissue mobilization was performed in the following areas:   Suboccipital bilateral, Cervical paraspinal mm. bilateral, Scalenes bilateral, Upper Trapezius bilateral, and Levator Scap. bilateral  Thoracic Paraspinal mm. bilateral, Middle Trapezius bilateral, Rhomboids bilateral,  Pectoralis bilateral, Subscapularis bilateral, and Latissimus Dorsi bilateral  Lumbar Paraspinal mm. bilateral, Quadratus Lumborum bilateral, Gluteal mm. Glute. Max.  and Glute. Med. bilateral, and Piriformis bilateral    The patient tolerated today's treatment with little or no additional discomfort and was instructed to contact the office for questions or concerns.

## 2024-05-21 ENCOUNTER — OFFICE VISIT (OUTPATIENT)
Dept: PODIATRY | Facility: CLINIC | Age: 29
End: 2024-05-21
Payer: COMMERCIAL

## 2024-05-21 DIAGNOSIS — M19.079 1ST MTP ARTHRITIS: ICD-10-CM

## 2024-05-21 DIAGNOSIS — M79.671 FOOT PAIN, BILATERAL: Primary | ICD-10-CM

## 2024-05-21 DIAGNOSIS — M25.571 SINUS TARSI SYNDROME OF BOTH ANKLES: ICD-10-CM

## 2024-05-21 DIAGNOSIS — M25.572 SINUS TARSI SYNDROME OF BOTH ANKLES: ICD-10-CM

## 2024-05-21 DIAGNOSIS — M79.672 FOOT PAIN, BILATERAL: Primary | ICD-10-CM

## 2024-05-21 PROCEDURE — 99203 OFFICE O/P NEW LOW 30 MIN: CPT | Performed by: PODIATRIST

## 2024-05-21 RX ORDER — IBUPROFEN 600 MG/1
600 TABLET ORAL 3 TIMES DAILY
Qty: 90 TABLET | Refills: 2 | Status: SHIPPED | OUTPATIENT
Start: 2024-05-21 | End: 2024-06-20

## 2024-05-21 NOTE — PROGRESS NOTES
This is a 29 y.o. 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    Review Of Systems:  GENERAL: No weight loss, malaise or fevers.  HEENT: Negative for frequent or significant headaches,   RESPIRATORY: Negative for cough, wheezing or shortness of breath.  CARDIOVASCULAR: Negative for chest pain, leg swelling or palpitations.    Physical Exam:  Patient is a pleasant, cooperative, well developed 29 y.o.  adult male. The patient is alert and oriented to time, place and person.   Patient has normal affect and mood.    Examination of Both Lower Extremities:   Objective:   Vasc: DP and PT pulses are palpable bilateral.  CFT is less than 3 seconds bilateral.  Skin temperature is warm to cool proximal to distal bilateral.      Neuro: Vibratory, light touch and proprioception are intact bilateral.      Derm: Nails 1-5 bilateral are intact.  Skin is supple with normal texture and turgor noted.  Webspaces are clean, dry and intact bilateral.  There are no hyperkeratoses, ulcerations, verruca or other lesions noted.      Ortho: Muscle strength is 5/5 for all pedal groups tested.  Pain to b/l sinus tarsi. No edema, erythema or ecchymosis noted.     1. Foot pain, bilateral  Referral to Physical Therapy    ibuprofen 600 mg tablet    XR foot 3+ views bilateral    XR ankle bilateral complete minimum 3 views      2. Sinus tarsi syndrome of both ankles  Referral to Physical Therapy    ibuprofen 600 mg tablet    XR foot 3+ views bilateral    XR  ankle bilateral complete minimum 3 views      3. 1st MTP arthritis  Referral to Physical Therapy    ibuprofen 600 mg tablet    XR foot 3+ views bilateral    XR ankle bilateral complete minimum 3 views          Patient exam and eval  Discussed pain in joints  Ordered foot and ankle xrays  Recommend PT  Placed order  Gave rx for ibu  Take prn pain  Fu if no improvement noted.   Patient was in agreement to this plan. All questions answered.      Sally Hathaway DPM  [phone]  [address]  Fax: [phone]

## 2024-06-17 ENCOUNTER — APPOINTMENT (OUTPATIENT)
Dept: INTEGRATIVE MEDICINE | Facility: CLINIC | Age: 29
End: 2024-06-17
Payer: COMMERCIAL

## 2024-06-19 ENCOUNTER — APPOINTMENT (OUTPATIENT)
Dept: INTEGRATIVE MEDICINE | Facility: CLINIC | Age: 29
End: 2024-06-19
Payer: COMMERCIAL

## 2024-06-24 ENCOUNTER — APPOINTMENT (OUTPATIENT)
Dept: PHYSICAL THERAPY | Facility: HOSPITAL | Age: 29
End: 2024-06-24
Payer: COMMERCIAL

## 2024-06-27 ENCOUNTER — APPOINTMENT (OUTPATIENT)
Dept: INTEGRATIVE MEDICINE | Facility: CLINIC | Age: 29
End: 2024-06-27
Payer: COMMERCIAL

## 2024-06-27 DIAGNOSIS — M54.2 NECK PAIN, CHRONIC: ICD-10-CM

## 2024-06-27 DIAGNOSIS — G89.29 NECK PAIN, CHRONIC: ICD-10-CM

## 2024-06-27 DIAGNOSIS — M99.03 SEGMENTAL AND SOMATIC DYSFUNCTION OF LUMBAR REGION: ICD-10-CM

## 2024-06-27 DIAGNOSIS — M99.02 SEGMENTAL AND SOMATIC DYSFUNCTION OF THORACIC REGION: Primary | ICD-10-CM

## 2024-06-27 DIAGNOSIS — G89.29 CHRONIC MIDLINE THORACIC BACK PAIN: ICD-10-CM

## 2024-06-27 DIAGNOSIS — M99.05 SEGMENTAL AND SOMATIC DYSFUNCTION OF PELVIC REGION: ICD-10-CM

## 2024-06-27 DIAGNOSIS — M54.6 CHRONIC MIDLINE THORACIC BACK PAIN: ICD-10-CM

## 2024-06-27 DIAGNOSIS — M79.10 MYALGIA: ICD-10-CM

## 2024-06-27 PROCEDURE — 97112 NEUROMUSCULAR REEDUCATION: CPT | Performed by: CHIROPRACTOR

## 2024-06-27 NOTE — PROGRESS NOTES
Subjective   Patient ID: Patrick Rey is a 29 y.o. male who presents June 27, 2024 for chiropractic care.    (0/12) [facility].  (7/20) [facility] [address]    Today, the patient rates their degree of pain as a 3 out of 10 on the numeric pain rating scale.     Patrick returns for continued chiropractic care. Today, he reports that he has been experiencing increased tension in the shoulders, low back, and under the left shoulder blade. 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 The patient denies any changes in health since his last encounter and will return in 1 month.      Objective   Physical Exam  Neurological:      General: No focal deficit present.      Mental Status: He is alert and oriented to person, place, and time.      Cranial Nerves: No dysarthria or facial asymmetry.      Sensory: Sensation is intact.      Motor: Motor function is intact.      Coordination: Coordination is intact.      Gait: Gait is intact.       Palpation of the following region(s) revealed:  Cervical: Scalenes bilateral, muscular hypertonicity.  Upper trapezius bilateral, muscular hypertonicity.  Levator scapulae bilateral, muscular hypertonicity.  Cervical paraspinals bilateral, muscular hypertonicity.  Thoracic: Thoracic paraspinals bilateral, muscular hypertonicity.  Rhomboids bilateral, muscular hypertonicity.  Pectoralis bilateral, muscular hypertonicity.  Latissimus dorsi bilateral, muscular hypertonicity.  Lumbar: Lumbar paraspinals bilateral, muscular hypertonicity.  Gluteal bilateral, muscular hypertonicity.    Assessment/Plan   Today's Treatment Included: Neuromuscular Re-Education (27063): Start time: 11:45 am End time: 12:00 pm  1 Units  Pin and stretch  Integrative Dry Needling (IDN) - Needles in / out:  19.    Soft-tissue mobilization was performed in the following areas:   Suboccipital bilateral, Cervical paraspinal mm. bilateral, Scalenes bilateral, Upper Trapezius  bilateral, and Levator Scap. bilateral  Thoracic Paraspinal mm. bilateral, Middle Trapezius bilateral, Rhomboids bilateral, Pectoralis bilateral, Subscapularis bilateral, and Latissimus Dorsi bilateral  Lumbar Paraspinal mm. bilateral, Quadratus Lumborum bilateral, Gluteal mm. Glute. Max.  and Glute. Med. bilateral, and Piriformis bilateral    The patient tolerated today's treatment with little or no additional discomfort and was instructed to contact the office for questions or concerns.

## 2024-07-16 ENCOUNTER — APPOINTMENT (OUTPATIENT)
Dept: PODIATRY | Facility: CLINIC | Age: 29
End: 2024-07-16
Payer: COMMERCIAL

## 2024-07-17 ENCOUNTER — APPOINTMENT (OUTPATIENT)
Dept: PRIMARY CARE | Facility: CLINIC | Age: 29
End: 2024-07-17
Payer: COMMERCIAL

## 2024-07-18 ENCOUNTER — TELEPHONE (OUTPATIENT)
Dept: PRIMARY CARE | Facility: CLINIC | Age: 29
End: 2024-07-18
Payer: COMMERCIAL

## 2024-07-18 NOTE — TELEPHONE ENCOUNTER
Called pt and offered pt 1st available with [facility] (end of August).  Pt states he will call back if needed because he is wanting a sooner appt to get established with a new PCP

## 2024-07-18 NOTE — TELEPHONE ENCOUNTER
Pt called  requesting 2nd chance, he missed appt due to death in family.    Ok to RS, but not with [facility], schedule NP appt with [facility] - first avail. Thx

## 2024-07-25 ENCOUNTER — APPOINTMENT (OUTPATIENT)
Dept: INTEGRATIVE MEDICINE | Facility: CLINIC | Age: 29
End: 2024-07-25
Payer: COMMERCIAL

## 2024-08-01 ENCOUNTER — APPOINTMENT (OUTPATIENT)
Dept: INTEGRATIVE MEDICINE | Facility: CLINIC | Age: 29
End: 2024-08-01
Payer: COMMERCIAL

## 2024-08-01 DIAGNOSIS — M79.10 MYALGIA: ICD-10-CM

## 2024-08-01 DIAGNOSIS — G89.29 NECK PAIN, CHRONIC: ICD-10-CM

## 2024-08-01 DIAGNOSIS — G89.29 CHRONIC MIDLINE THORACIC BACK PAIN: ICD-10-CM

## 2024-08-01 DIAGNOSIS — M99.02 SEGMENTAL AND SOMATIC DYSFUNCTION OF THORACIC REGION: Primary | ICD-10-CM

## 2024-08-01 DIAGNOSIS — M54.2 NECK PAIN, CHRONIC: ICD-10-CM

## 2024-08-01 DIAGNOSIS — M99.05 SEGMENTAL AND SOMATIC DYSFUNCTION OF PELVIC REGION: ICD-10-CM

## 2024-08-01 DIAGNOSIS — M54.6 CHRONIC MIDLINE THORACIC BACK PAIN: ICD-10-CM

## 2024-08-01 DIAGNOSIS — M99.03 SEGMENTAL AND SOMATIC DYSFUNCTION OF LUMBAR REGION: ICD-10-CM

## 2024-08-01 PROCEDURE — 97112 NEUROMUSCULAR REEDUCATION: CPT | Performed by: CHIROPRACTOR

## 2024-08-01 PROCEDURE — 98941 CHIROPRACT MANJ 3-4 REGIONS: CPT | Performed by: CHIROPRACTOR

## 2024-08-01 NOTE — PROGRESS NOTES
Subjective   Patient ID: Patrick Rey is a 29 y.o. male who presents August 1, 2024 for chiropractic care.    (1/12) [facility].  (8/20) [facility] [address]    Today, the patient rates their degree of pain as a 3 out of 10 on the numeric pain rating scale.     Patrick returns for continued chiropractic care. Today, he reports that he has been doing well. He states that he has been super busy at work, which has led to feeling some stiffness in the low back, the neck and the upper back. The patient denies any changes in health since his last encounter and will return in 1 month.      Objective   Physical Exam  Neurological:      General: No focal deficit present.      Mental Status: He is alert and oriented to person, place, and time.      Cranial Nerves: No dysarthria or facial asymmetry.      Sensory: Sensation is intact.      Motor: Motor function is intact.      Coordination: Coordination is intact.      Gait: Gait is intact.       Palpation of the following region(s) revealed:  Cervical: Scalenes bilateral, muscular hypertonicity.  Upper trapezius bilateral, muscular hypertonicity.  Levator scapulae bilateral, muscular hypertonicity.  Cervical paraspinals bilateral, muscular hypertonicity.  Thoracic: Thoracic paraspinals bilateral, muscular hypertonicity.  Rhomboids bilateral, muscular hypertonicity.  Pectoralis bilateral, muscular hypertonicity.  Latissimus dorsi bilateral, muscular hypertonicity.  Lumbar: Lumbar paraspinals bilateral, muscular hypertonicity.  Gluteal bilateral, muscular hypertonicity.    Assessment/Plan   Today's Treatment Included: Chiropractic manipulation to the Segmental Joint(s) Cervical : C4 C5 Segmental Joint(s) Lumbopelvic/Sacral SIJ : L4, L5/S1, and R SIJ Segmental Joint(s) Thoracic : T4, T5, and T6   Treatment Techniques Used : Diversified CMT, Manual traction, and Low force  Neuromuscular Re-Education (76045): Start time: 11:10 am End time: 11:40 am  2 Units  Pin and  stretch    Soft-tissue mobilization was performed in the following areas:   Suboccipital bilateral, Cervical paraspinal mm. bilateral, Scalenes bilateral, Upper Trapezius bilateral, and Levator Scap. bilateral  Thoracic Paraspinal mm. bilateral, Middle Trapezius bilateral, Rhomboids bilateral, Pectoralis bilateral, Subscapularis bilateral, and Latissimus Dorsi bilateral  Lumbar Paraspinal mm. bilateral, Quadratus Lumborum bilateral, Gluteal mm. Glute. Max.  and Glute. Med. bilateral, and Piriformis bilateral    The patient tolerated today's treatment with little or no additional discomfort and was instructed to contact the office for questions or concerns.

## 2024-08-06 ENCOUNTER — APPOINTMENT (OUTPATIENT)
Dept: PRIMARY CARE | Facility: CLINIC | Age: 29
End: 2024-08-06
Payer: COMMERCIAL

## 2024-08-06 ENCOUNTER — LAB (OUTPATIENT)
Dept: LAB | Facility: LAB | Age: 29
End: 2024-08-06
Payer: COMMERCIAL

## 2024-08-06 VITALS
HEART RATE: 68 BPM | BODY MASS INDEX: 22.37 KG/M2 | HEIGHT: 71 IN | RESPIRATION RATE: 16 BRPM | WEIGHT: 159.8 LBS | SYSTOLIC BLOOD PRESSURE: 99 MMHG | OXYGEN SATURATION: 98 % | DIASTOLIC BLOOD PRESSURE: 63 MMHG

## 2024-08-06 DIAGNOSIS — R53.83 FATIGUE, UNSPECIFIED TYPE: ICD-10-CM

## 2024-08-06 DIAGNOSIS — D64.9 ANEMIA, UNSPECIFIED TYPE: ICD-10-CM

## 2024-08-06 DIAGNOSIS — R40.0 DAYTIME SLEEPINESS: ICD-10-CM

## 2024-08-06 DIAGNOSIS — D72.819 LEUKOPENIA, UNSPECIFIED TYPE: ICD-10-CM

## 2024-08-06 DIAGNOSIS — R53.83 FATIGUE, UNSPECIFIED TYPE: Primary | ICD-10-CM

## 2024-08-06 DIAGNOSIS — R17 ELEVATED BILIRUBIN: ICD-10-CM

## 2024-08-06 PROBLEM — M54.41 RIGHT-SIDED LOW BACK PAIN WITH SCIATICA: Status: RESOLVED | Noted: 2023-09-30 | Resolved: 2024-08-06

## 2024-08-06 PROBLEM — J32.4 CHRONIC PANSINUSITIS: Status: RESOLVED | Noted: 2023-09-30 | Resolved: 2024-08-06

## 2024-08-06 LAB — TSH SERPL-ACNC: 1.09 MIU/L (ref 0.44–3.98)

## 2024-08-06 PROCEDURE — 84443 ASSAY THYROID STIM HORMONE: CPT

## 2024-08-06 PROCEDURE — 84402 ASSAY OF FREE TESTOSTERONE: CPT

## 2024-08-06 PROCEDURE — 3008F BODY MASS INDEX DOCD: CPT | Performed by: FAMILY MEDICINE

## 2024-08-06 PROCEDURE — 36415 COLL VENOUS BLD VENIPUNCTURE: CPT

## 2024-08-06 PROCEDURE — 99203 OFFICE O/P NEW LOW 30 MIN: CPT | Performed by: FAMILY MEDICINE

## 2024-08-06 PROCEDURE — 1036F TOBACCO NON-USER: CPT | Performed by: FAMILY MEDICINE

## 2024-08-06 NOTE — PATIENT INSTRUCTIONS
Nonfasting labs.  Will consider sleep study if labs unremarkable.  Will consider endocrine referral if labs and sleep study unremarkable.  Follow up with hematology for plan of care regarding blood counts and liver function tests.    Schedule annual wellness visit.     Lab services: [address]  Hours: M-F 6:30a-6p, Sat 8a-12p  Phone: [phone], [address]

## 2024-08-06 NOTE — PROGRESS NOTES
"Subjective   Patient ID: Patrick Rey is a 29 y.o. male who presents for Fatigue.    HPI   Initial visit.    Reports intermittent fatigue x 3-4 yrs.  Wonders if it's due to cortisol abnormality.  Followed  hematology for anemia, leukopenia.  Had labs (ordered by hematology) 7/10/24.  Labs showed decreased WBC, decreased H/H, decreased alk phos, elevated bilirubin.  No office visit w/hematology since having labs drawn.    Reports snoring, occ daytime fatigue.  No witnessed apneic episodes.    Reviewed/Updated Active problem list, PMH, PSH, FH, SH, Meds, Allergies.     Review of Systems  No orthostatic complaints, dizziness, chest pain, presyncope, SOB, palpitations.    Objective   BP 99/63   Pulse 68   Resp 16   Ht 1.791 m (5' 10.5\")   Wt 72.5 kg (159 lb 12.8 oz)   SpO2 98%   BMI 22.60 kg/m²     Physical Exam  Constitutional:       General: He is not in acute distress.     Appearance: He is normal weight.   Cardiovascular:      Rate and Rhythm: Normal rate and regular rhythm.      Heart sounds: Normal heart sounds. No murmur heard.     No friction rub. No gallop.   Pulmonary:      Effort: Pulmonary effort is normal.      Breath sounds: Normal breath sounds. No wheezing, rhonchi or rales.   Neurological:      Mental Status: He is oriented to person, place, and time.   Psychiatric:         Mood and Affect: Mood normal.         Behavior: Behavior normal.     Assessment/Plan   Diagnoses and all orders for this visit:  Fatigue, unspecified type  -     TSH with reflex to Free T4 if abnormal; Future  -     Testosterone, total and free; Future  Daytime sleepiness  Leukopenia, unspecified type  Anemia, unspecified type  Elevated bilirubin    Nonfasting labs.  Will consider sleep study if labs unremarkable.  Will consider endocrine referral if labs and sleep study unremarkable.  Follow up with hematology for plan of care regarding blood counts and liver function tests.    Schedule annual wellness visit.   "

## 2024-08-07 ENCOUNTER — PATIENT MESSAGE (OUTPATIENT)
Dept: ORTHOPEDIC SURGERY | Facility: CLINIC | Age: 29
End: 2024-08-07
Payer: COMMERCIAL

## 2024-08-11 LAB
TESTOSTERONE FREE (CHAN): 92.7 PG/ML (ref 35–155)
TESTOSTERONE,TOTAL,LC-MS/MS: 562 NG/DL (ref 250–1100)

## 2024-08-12 DIAGNOSIS — R40.0 DAYTIME SLEEPINESS: Primary | ICD-10-CM

## 2024-08-29 ENCOUNTER — APPOINTMENT (OUTPATIENT)
Dept: INTEGRATIVE MEDICINE | Facility: CLINIC | Age: 29
End: 2024-08-29
Payer: COMMERCIAL

## 2024-09-05 ENCOUNTER — APPOINTMENT (OUTPATIENT)
Dept: INTEGRATIVE MEDICINE | Facility: CLINIC | Age: 29
End: 2024-09-05
Payer: COMMERCIAL

## 2024-09-05 DIAGNOSIS — M54.2 NECK PAIN, CHRONIC: Primary | ICD-10-CM

## 2024-09-05 DIAGNOSIS — M79.10 MYALGIA: ICD-10-CM

## 2024-09-05 DIAGNOSIS — G89.29 NECK PAIN, CHRONIC: Primary | ICD-10-CM

## 2024-09-05 PROCEDURE — 97112 NEUROMUSCULAR REEDUCATION: CPT | Performed by: CHIROPRACTOR

## 2024-09-05 NOTE — PROGRESS NOTES
Subjective   Patient ID: Patrick Rey is a 29 y.o. male who presents September 5, 2024 for chiropractic care.    (2/12) [facility].  (9/20) [facility] [id number]    Today, the patient rates their degree of pain as a 4 out of 10 on the numeric pain rating scale.     Patrick arrives today for chiropractic care of neck and upper back stiffness. He has been treating with my colleague, Dr. Marroquin, and an outside chiropractor with good benefit. He arrives today with complaint of right-sided neck and trapezius tension without inciting trauma/incident. He notes that this was previously more severe, with restricted neck range of motion and tightness. He was treated by his outside chiro a few sessions with some improvement noted. He arrives today with improving stiffness, but remains localized to the right upper trapezius region and neck . Denies other changes to health.      Objective   Physical Exam  Neurological:      General: No focal deficit present.      Mental Status: He is alert and oriented to person, place, and time.      Cranial Nerves: No dysarthria or facial asymmetry.      Sensory: Sensation is intact.      Motor: Motor function is intact.      Coordination: Coordination is intact.      Gait: Gait is intact.       Palpation of the following region(s) revealed:  Cervical: Scalenes bilateral, muscular hypertonicity.  Upper trapezius right, hypertonicity and tenderness.  Levator scapulae right, hypertonicity and tenderness.  Cervical paraspinals right, hypertonicity and tenderness.  Thoracic: Thoracic paraspinals bilateral, muscular hypertonicity.  Rhomboids bilateral, muscular hypertonicity.  Pectoralis bilateral, muscular hypertonicity.  Latissimus dorsi bilateral, muscular hypertonicity.    Assessment/Plan   Today's Treatment Included: Neuromuscular Re-Education (98326): Start time: 8:30 am End time: 8:40 am  1 Units  Pin and stretch  Integrative Dry Needling (IDN) - Needles in / out:  5.  IDN: BL upper trap, R  levator scap, R C5 PS, subscap R    Seated ART to R upper trap, levator scap    Soft-tissue mobilization was performed in the following areas:   Cervical paraspinal mm. bilateral, Upper Trapezius bilateral, and Levator Scap. bilateral  Thoracic Paraspinal mm. right, Middle Trapezius right, Rhomboids bilateral, Pectoralis bilateral, Subscapularis bilateral, and Latissimus Dorsi bilateral    The patient tolerated today's treatment with little or no additional discomfort and was instructed to contact the office for questions or concerns.

## 2024-09-17 ENCOUNTER — APPOINTMENT (OUTPATIENT)
Dept: INTEGRATIVE MEDICINE | Facility: CLINIC | Age: 29
End: 2024-09-17
Payer: COMMERCIAL

## 2024-09-17 DIAGNOSIS — M99.03 SEGMENTAL AND SOMATIC DYSFUNCTION OF LUMBAR REGION: ICD-10-CM

## 2024-09-17 DIAGNOSIS — M99.05 SEGMENTAL AND SOMATIC DYSFUNCTION OF PELVIC REGION: ICD-10-CM

## 2024-09-17 DIAGNOSIS — G89.29 NECK PAIN, CHRONIC: ICD-10-CM

## 2024-09-17 DIAGNOSIS — M79.10 MYALGIA: ICD-10-CM

## 2024-09-17 DIAGNOSIS — M54.2 NECK PAIN, CHRONIC: ICD-10-CM

## 2024-09-17 DIAGNOSIS — G89.29 CHRONIC MIDLINE THORACIC BACK PAIN: ICD-10-CM

## 2024-09-17 DIAGNOSIS — M99.02 SEGMENTAL AND SOMATIC DYSFUNCTION OF THORACIC REGION: Primary | ICD-10-CM

## 2024-09-17 DIAGNOSIS — M54.6 CHRONIC MIDLINE THORACIC BACK PAIN: ICD-10-CM

## 2024-09-17 NOTE — PROGRESS NOTES
Subjective   Patient ID: Patrick Rey is a 29 y.o. male who presents September 17, 2024 for chiropractic care.    (3/12) VPCY MANIP.  (9/20) VPCY [id number]    Today, the patient rates their degree of pain as a 3 out of 10 on the numeric pain rating scale.     Patrick returns for continued chiropractic care. Today, he states that he has been doing well since his last treatment with Dr. Samano. He mentioned that his neck pain was fairly severe at his last appointment and has significantly improved. He states that his range of motion still feels a little limited and stiff. The patient denies any changes in health since his last encounter and will return in 1 month.      Objective   Physical Exam  Neurological:      General: No focal deficit present.      Mental Status: He is alert and oriented to person, place, and time.      Cranial Nerves: No dysarthria or facial asymmetry.      Sensory: Sensation is intact.      Motor: Motor function is intact.      Coordination: Coordination is intact.      Gait: Gait is intact.     Palpation of the following region(s) revealed:  Cervical: Scalenes bilateral, muscular hypertonicity.  Upper trapezius bilateral, muscular hypertonicity.  Levator scapulae bilateral, muscular hypertonicity.  Cervical paraspinals bilateral, muscular hypertonicity.  Thoracic: Thoracic paraspinals bilateral, muscular hypertonicity.  Rhomboids bilateral, muscular hypertonicity.  Pectoralis bilateral, muscular hypertonicity.  Latissimus dorsi bilateral, muscular hypertonicity.  Lumbar: Lumbar paraspinals bilateral, muscular hypertonicity.  Gluteal bilateral, muscular hypertonicity.    Assessment/Plan   Today's Treatment Included: Performed spinal manipulation to the following regions of segmental dysfunction identified on examination, using age-appropriate and injury specific force. Segmental Joint(s) Cervical : C2 C4 Segmental Joint(s) Thoracic : T4, T5, and T10 Segmental Joint(s) Lumbopelvic/Sacral SIJ :  L2, L5/S1, R SIJ, and L SIJ    Chiropractic manipulation treatment techniques utilized: Treatment Techniques Used : Diversified CMT, Pelvic drop table technique, and Pelvic blocking technique  Soft tissue mobilization techniques utilized during treatment: Soft Tissue Moblization Techniques: Active Release Technique, Pin and Stretch, and Manual Dynamic Stretching  Integrative Dry Needling (IDN) - Needles in/out:  10.  Neuromuscular Re-Education (64233): 2 Units; Start time: 9:45am  End time: 10:15am      Soft-tissue mobilization was performed in the following areas:   Suboccipital bilateral, Cervical paraspinal mm. bilateral, Scalenes bilateral, Upper Trapezius bilateral, and Levator Scap. bilateral  Thoracic Paraspinal mm. bilateral, Middle Trapezius bilateral, Rhomboids bilateral, Pectoralis bilateral, Subscapularis bilateral, and Latissimus Dorsi bilateral  Lumbar Paraspinal mm. bilateral, Quadratus Lumborum bilateral, Gluteal mm. Glute. Max.  and Glute. Med. bilateral, and Piriformis bilateral    The patient tolerated today's treatment with little or no additional discomfort and was instructed to contact the office for questions or concerns.

## 2024-10-03 ENCOUNTER — APPOINTMENT (OUTPATIENT)
Dept: INTEGRATIVE MEDICINE | Facility: CLINIC | Age: 29
End: 2024-10-03
Payer: COMMERCIAL

## 2024-10-03 DIAGNOSIS — M54.2 NECK PAIN, CHRONIC: ICD-10-CM

## 2024-10-03 DIAGNOSIS — M99.03 SEGMENTAL AND SOMATIC DYSFUNCTION OF LUMBAR REGION: ICD-10-CM

## 2024-10-03 DIAGNOSIS — M99.02 SEGMENTAL AND SOMATIC DYSFUNCTION OF THORACIC REGION: Primary | ICD-10-CM

## 2024-10-03 DIAGNOSIS — M79.10 MYALGIA: ICD-10-CM

## 2024-10-03 DIAGNOSIS — G89.29 NECK PAIN, CHRONIC: ICD-10-CM

## 2024-10-03 DIAGNOSIS — M54.81 OCCIPITAL NEURITIS: ICD-10-CM

## 2024-10-03 DIAGNOSIS — M99.05 SEGMENTAL AND SOMATIC DYSFUNCTION OF PELVIC REGION: ICD-10-CM

## 2024-10-03 DIAGNOSIS — M54.6 CHRONIC MIDLINE THORACIC BACK PAIN: ICD-10-CM

## 2024-10-03 DIAGNOSIS — G89.29 CHRONIC MIDLINE THORACIC BACK PAIN: ICD-10-CM

## 2024-10-03 PROCEDURE — 98941 CHIROPRACT MANJ 3-4 REGIONS: CPT | Performed by: CHIROPRACTOR

## 2024-10-03 NOTE — PROGRESS NOTES
Subjective   Patient ID: Patrick Rey is a 29 y.o. male who presents October 3, 2024 for chiropractic care.    (4/12) VPCY MANIP.  (9/20) CY [id number]    Today, the patient rates their degree of pain as a 3 out of 10 on the numeric pain rating scale.     Patrick returns for continued chiropractic care. Today, he reports that he has been doing well. He states that he has been experiencing some tension in the shoulders but his neck has continued to do well. The patient denies any changes in health since his last encounter and will return in 1 month.      Objective   Physical Exam  Neurological:      General: No focal deficit present.      Mental Status: He is alert and oriented to person, place, and time.      Cranial Nerves: No dysarthria or facial asymmetry.      Sensory: Sensation is intact.      Motor: Motor function is intact.      Coordination: Coordination is intact.      Gait: Gait is intact.       Palpation of the following region(s) revealed:  Cervical: Scalenes bilateral, muscular hypertonicity.  Upper trapezius bilateral, muscular hypertonicity.  Levator scapulae bilateral, muscular hypertonicity.  Cervical paraspinals bilateral, muscular hypertonicity.  Thoracic: Thoracic paraspinals bilateral, muscular hypertonicity.  Rhomboids bilateral, muscular hypertonicity.  Pectoralis bilateral, muscular hypertonicity.  Latissimus dorsi bilateral, muscular hypertonicity.  Lumbar: Lumbar paraspinals bilateral, muscular hypertonicity.  Gluteal bilateral, muscular hypertonicity.    Assessment/Plan   Today's Treatment Included: Performed spinal manipulation to the following regions of segmental dysfunction identified on examination, using age-appropriate and injury specific force. Segmental Joint(s) Cervical : C2 C4 Segmental Joint(s) Thoracic : T4, T5, and T10 Segmental Joint(s) Lumbopelvic/Sacral SIJ : L2, L5/S1, R SIJ, and L SIJ    Chiropractic manipulation treatment techniques utilized: Treatment Techniques  Used : Diversified CMT, Pelvic drop table technique, and Pelvic blocking technique  Soft tissue mobilization techniques utilized during treatment: Soft Tissue Moblization Techniques: Active Release Technique, Pin and Stretch, and Manual Dynamic Stretching  Integrative Dry Needling (IDN) - Needles in/out:  14.    Soft-tissue mobilization was performed in the following areas:   Suboccipital bilateral, Cervical paraspinal mm. bilateral, Scalenes bilateral, Upper Trapezius bilateral, and Levator Scap. bilateral  Thoracic Paraspinal mm. bilateral, Middle Trapezius bilateral, Rhomboids bilateral, Pectoralis bilateral, Subscapularis bilateral, and Latissimus Dorsi bilateral  Lumbar Paraspinal mm. bilateral, Quadratus Lumborum bilateral, Gluteal mm. Glute. Max.  and Glute. Med. bilateral, and Piriformis bilateral    The patient tolerated today's treatment with little or no additional discomfort and was instructed to contact the office for questions or concerns.

## 2024-10-14 ENCOUNTER — TRANSCRIBE ORDERS (OUTPATIENT)
Dept: ORTHOPEDIC SURGERY | Facility: HOSPITAL | Age: 29
End: 2024-10-14
Payer: COMMERCIAL

## 2024-10-14 DIAGNOSIS — M54.50 LOW BACK PAIN, UNSPECIFIED BACK PAIN LATERALITY, UNSPECIFIED CHRONICITY, UNSPECIFIED WHETHER SCIATICA PRESENT: ICD-10-CM

## 2024-10-15 ENCOUNTER — HOSPITAL ENCOUNTER (OUTPATIENT)
Dept: RADIOLOGY | Facility: CLINIC | Age: 29
Discharge: HOME | End: 2024-10-15
Payer: COMMERCIAL

## 2024-10-15 ENCOUNTER — APPOINTMENT (OUTPATIENT)
Dept: RADIOLOGY | Facility: CLINIC | Age: 29
End: 2024-10-15
Payer: COMMERCIAL

## 2024-10-15 ENCOUNTER — OFFICE VISIT (OUTPATIENT)
Dept: ORTHOPEDIC SURGERY | Facility: CLINIC | Age: 29
End: 2024-10-15
Payer: COMMERCIAL

## 2024-10-15 ENCOUNTER — OFFICE VISIT (OUTPATIENT)
Dept: PRIMARY CARE | Facility: CLINIC | Age: 29
End: 2024-10-15
Payer: COMMERCIAL

## 2024-10-15 VITALS
DIASTOLIC BLOOD PRESSURE: 58 MMHG | OXYGEN SATURATION: 98 % | BODY MASS INDEX: 24.08 KG/M2 | SYSTOLIC BLOOD PRESSURE: 96 MMHG | RESPIRATION RATE: 16 BRPM | HEIGHT: 71 IN | HEART RATE: 65 BPM | WEIGHT: 172 LBS

## 2024-10-15 VITALS — WEIGHT: 160 LBS | HEIGHT: 71 IN | BODY MASS INDEX: 22.4 KG/M2

## 2024-10-15 DIAGNOSIS — S90.121A CONTUSION OF FIFTH TOE OF RIGHT FOOT, INITIAL ENCOUNTER: Primary | ICD-10-CM

## 2024-10-15 DIAGNOSIS — M54.9 CHRONIC BACK PAIN GREATER THAN 3 MONTHS DURATION: ICD-10-CM

## 2024-10-15 DIAGNOSIS — S90.121A CONTUSION OF FIFTH TOE OF RIGHT FOOT, INITIAL ENCOUNTER: ICD-10-CM

## 2024-10-15 DIAGNOSIS — M54.50 LOW BACK PAIN, UNSPECIFIED BACK PAIN LATERALITY, UNSPECIFIED CHRONICITY, UNSPECIFIED WHETHER SCIATICA PRESENT: ICD-10-CM

## 2024-10-15 DIAGNOSIS — G89.29 CHRONIC BACK PAIN GREATER THAN 3 MONTHS DURATION: ICD-10-CM

## 2024-10-15 PROCEDURE — 99213 OFFICE O/P EST LOW 20 MIN: CPT | Performed by: FAMILY MEDICINE

## 2024-10-15 PROCEDURE — 73660 X-RAY EXAM OF TOE(S): CPT | Mod: RIGHT SIDE | Performed by: RADIOLOGY

## 2024-10-15 PROCEDURE — 3008F BODY MASS INDEX DOCD: CPT | Performed by: FAMILY MEDICINE

## 2024-10-15 PROCEDURE — 99214 OFFICE O/P EST MOD 30 MIN: CPT | Performed by: ORTHOPAEDIC SURGERY

## 2024-10-15 PROCEDURE — 3008F BODY MASS INDEX DOCD: CPT | Performed by: ORTHOPAEDIC SURGERY

## 2024-10-15 PROCEDURE — 72110 X-RAY EXAM L-2 SPINE 4/>VWS: CPT

## 2024-10-15 PROCEDURE — 73660 X-RAY EXAM OF TOE(S): CPT | Mod: RT

## 2024-10-15 PROCEDURE — 72110 X-RAY EXAM L-2 SPINE 4/>VWS: CPT | Performed by: RADIOLOGY

## 2024-10-15 PROCEDURE — 1036F TOBACCO NON-USER: CPT | Performed by: FAMILY MEDICINE

## 2024-10-15 PROCEDURE — 1036F TOBACCO NON-USER: CPT | Performed by: ORTHOPAEDIC SURGERY

## 2024-10-15 NOTE — PROGRESS NOTES
"Subjective   Patient ID: Patrick Rey is a 29 y.o. male who presents for Toe Injury (Right foot ).    HPI   Right 5th toe pain x 2 days (stubbed right foot against couch).  Pain is dull, aching.  Worse w/standing.  Improved w/sitting, ice.  Max 7/10.  Ave 4/10.    Review of Systems  No other complaints.     Objective   BP 96/58   Pulse 65   Resp 16   Ht 1.791 m (5' 10.5\")   Wt 78 kg (172 lb)   SpO2 98%   BMI 24.33 kg/m²     Physical Exam  Constitutional:       General: He is not in acute distress.     Appearance: He is normal weight.   Musculoskeletal:      Comments: Mild tenderness at base of right 5th toe.  No swelling or bruising.   Neurological:      Mental Status: He is oriented to person, place, and time.   Psychiatric:         Mood and Affect: Mood normal.         Behavior: Behavior normal.     Assessment/Plan   Diagnoses and all orders for this visit:  Contusion of fifth toe of right foot, initial encounter  -     XR toe right 2+ views; Future    X-ray.  OTC analgesics as needed.  Call, send message through [employer], or return to office prn if these symptoms worsen or fail to improve as anticipated.     Schedule annual wellness visit.   "

## 2024-10-15 NOTE — PATIENT INSTRUCTIONS
X-ray.  OTC analgesics as needed.  Call, send message through [employer], or return to office prn if these symptoms worsen or fail to improve as anticipated.     Schedule annual wellness visit.

## 2024-10-15 NOTE — PROGRESS NOTES
HPI:Patrick Rey is a 29-year-old man with past medical history significant for remote C1-C2 fusion at the age of 7, who comes in today with complaints of some chronic pain in between shoulder blades.  He has no focal neurologic symptoms.  He has not had recent physical therapy.  He does get some massage periodically.  He has not had acupuncture.      ROS:  Reviewed on EMR and patient intake sheet.    PMH/SH:   Reviewed on EMR and patient intake sheet.    Exam:  Physical Exam    Constitutional: Well appearing; no acute distress  Eyes: pupils are equal and round  Psych: normal affect  Respiratory: non-labored breathing  Cardiovascular: regular rate and rhythm  GI: non-distended abdomen  Musculoskeletal: no pain with range of motion of the shoulders bilaterally; no signs of impingement  Neurologic: [5]/5 strength in the upper extremities bilaterally]; [negative] Crump's; [no hyper-reflexia]    Radiology:  X-rays and CT scan of the cervical spine personally reviewed.  Evidence of a cerclage wire at C1-C2.  No abnormal motion on flexion-extension films.  X-rays lumbar spine unremarkable    Diagnosis:  Chronic back pain    Assessment and Plan:   29-year-old man with some chronic largely myofascial back pain.  At this point I recommended some acupuncture in addition to his massage.  I can see him back as needed.  No further treatment required at this time.    The patient was in agreement with the plan. At the end of the visit today, the patient felt that all questions had been answered satisfactorily.  The patient was pleased with the visit and very appreciative for the care rendered.     Thank you very much for the kind referral.  It is a privilege, and a pleasure, to partner with you in the care of your patients.  I would be delighted to assist you with any further consultations as needed.      Everett Scott MD    Chief of Spine Surgery, [facility]  Director of Spine  Service, [facility]  , Department of Orthopaedics  [facility] [employer]  [address] [place] [address]  P: [phone]  [email or web address]    This note was dictated with voice recognition software.  It has not been proofread for grammatical errors, typographical mistakes or other semantic inconsistencies.

## 2024-10-15 NOTE — LETTER
October 15, 2024     Dillon Dang MD  [address]  [facility], [address]  [facility] [place] [address]    Patient: Patrick Rey   YOB: 1995   Date of Visit: 10/15/2024       Dear Dr. Dillon Dang MD:    Thank you for referring Patrick Rey to me for evaluation. Below are my notes for this consultation.  If you have questions, please do not hesitate to call me. I look forward to following your patient along with you.       Sincerely,     Everett Scott MD      CC: No Recipients  ______________________________________________________________________________________    HPI:Patrick Rey is a 29-year-old man with past medical history significant for remote C1-C2 fusion at the age of 7, who comes in today with complaints of some chronic pain in between shoulder blades.  He has no focal neurologic symptoms.  He has not had recent physical therapy.  He does get some massage periodically.  He has not had acupuncture.      ROS:  Reviewed on EMR and patient intake sheet.    PMH/SH:   Reviewed on EMR and patient intake sheet.    Exam:  Physical Exam    Constitutional: Well appearing; no acute distress  Eyes: pupils are equal and round  Psych: normal affect  Respiratory: non-labored breathing  Cardiovascular: regular rate and rhythm  GI: non-distended abdomen  Musculoskeletal: no pain with range of motion of the shoulders bilaterally; no signs of impingement  Neurologic: [5]/5 strength in the upper extremities bilaterally]; [negative] Crump's; [no hyper-reflexia]    Radiology:  X-rays and CT scan of the cervical spine personally reviewed.  Evidence of a cerclage wire at C1-C2.  No abnormal motion on flexion-extension films.  X-rays lumbar spine unremarkable    Diagnosis:  Chronic back pain    Assessment and Plan:   29-year-old man with some chronic largely myofascial back pain.  At this point I recommended some acupuncture in addition to his massage.  I can see him back as  needed.  No further treatment required at this time.    The patient was in agreement with the plan. At the end of the visit today, the patient felt that all questions had been answered satisfactorily.  The patient was pleased with the visit and very appreciative for the care rendered.     Thank you very much for the kind referral.  It is a privilege, and a pleasure, to partner with you in the care of your patients.  I would be delighted to assist you with any further consultations as needed.      Everett Scott MD    Chief of Spine Surgery, [facility]  Director of Spine Service, [facility]  , Department of Orthopaedics  [facility] [employer]  [address] [place] [address]  P: [phone]  [email or web address]    This note was dictated with voice recognition software.  It has not been proofread for grammatical errors, typographical mistakes or other semantic inconsistencies.

## 2024-10-17 DIAGNOSIS — S92.514A CLOSED NONDISPLACED FRACTURE OF PROXIMAL PHALANX OF LESSER TOE OF RIGHT FOOT, INITIAL ENCOUNTER: Primary | ICD-10-CM

## 2024-10-23 ENCOUNTER — APPOINTMENT (OUTPATIENT)
Dept: INTEGRATIVE MEDICINE | Facility: CLINIC | Age: 29
End: 2024-10-23
Payer: COMMERCIAL

## 2024-11-19 ENCOUNTER — APPOINTMENT (OUTPATIENT)
Dept: INTEGRATIVE MEDICINE | Facility: CLINIC | Age: 29
End: 2024-11-19
Payer: COMMERCIAL

## 2024-11-19 DIAGNOSIS — M79.10 MYALGIA: ICD-10-CM

## 2024-11-19 DIAGNOSIS — G89.29 NECK PAIN, CHRONIC: ICD-10-CM

## 2024-11-19 DIAGNOSIS — M54.2 NECK PAIN, CHRONIC: ICD-10-CM

## 2024-11-19 DIAGNOSIS — M99.03 SEGMENTAL AND SOMATIC DYSFUNCTION OF LUMBAR REGION: ICD-10-CM

## 2024-11-19 DIAGNOSIS — M99.05 SEGMENTAL AND SOMATIC DYSFUNCTION OF PELVIC REGION: ICD-10-CM

## 2024-11-19 DIAGNOSIS — G89.29 CHRONIC MIDLINE THORACIC BACK PAIN: ICD-10-CM

## 2024-11-19 DIAGNOSIS — M99.02 SEGMENTAL AND SOMATIC DYSFUNCTION OF THORACIC REGION: Primary | ICD-10-CM

## 2024-11-19 DIAGNOSIS — M54.6 CHRONIC MIDLINE THORACIC BACK PAIN: ICD-10-CM

## 2024-12-07 NOTE — PROGRESS NOTES
Subjective   Patient ID: Patrick Rey is a 29 y.o. male who presents November 19, 2024 for chiropractic care.    (1/20 ) VPCY    Today, the patient rates their degree of pain as a 4 out of 10 on the numeric pain rating scale.     Patrick returns for continued chiropractic care. Today, he reports that he has been doing well. He states that he received a new position as work, which has helped to reduce some stress. The patient denies any changes in health since his last encounter and will return in 1 month.      Objective   Physical Exam  Neurological:      General: No focal deficit present.      Mental Status: He is alert and oriented to person, place, and time.      Cranial Nerves: No dysarthria or facial asymmetry.      Sensory: Sensation is intact.      Motor: Motor function is intact.      Coordination: Coordination is intact.      Gait: Gait is intact.       Palpation of the following region(s) revealed:  Cervical: Scalenes bilateral, muscular hypertonicity.  Upper trapezius bilateral, muscular hypertonicity.  Levator scapulae bilateral, muscular hypertonicity.  Cervical paraspinals bilateral, muscular hypertonicity.  Thoracic: Thoracic paraspinals bilateral, muscular hypertonicity.  Rhomboids bilateral, muscular hypertonicity.  Pectoralis bilateral, muscular hypertonicity.  Latissimus dorsi bilateral, muscular hypertonicity.  Lumbar: Lumbar paraspinals bilateral, muscular hypertonicity.  Quadratus lumborum bilateral, muscular hypertonicity.  Gluteal bilateral, muscular hypertonicity.    Segmental Joint(s): Segmental joint dysfunction was assessed with motion palpation and is identified in the following areas:  Cervical : C5 C6  Thoracic : T5 and T8  Lumbopelvic / Sacral SIJ : L4, L5/S1, and R SIJ    Assessment/Plan   Today's Treatment Included: Spinal manipulation to the following regions of segmental dysfunction identified on examination, using age-appropriate and injury specific force. Segmental  Joint(s) Cervical : C2 C5 C6 Segmental Joint(s) Thoracic : T4, T5, and T8 Segmental Joint(s) Lumbopelvic/Sacral SIJ : L4, L5/S1, R SIJ, and L SIJ  Chiropractic manipulation treatment techniques utilized: Diversified CMT, Pelvic drop table technique, and Pelvic blocking technique  Soft tissue mobilization techniques utilized during treatment: Pin and Stretch, Cupping, and Ischemic compression  Integrative Dry Needling (IDN) - Needles in/out:  14.  Neuromuscular Re-Education (46802): 2 Units; Start time: 12:10p  End time: 12:40p      Soft-tissue mobilization was performed in the following areas:   Cervical paraspinal mm. bilateral, Scalenes bilateral, Upper Trapezius bilateral, and Levator Scap. bilateral  Thoracic Paraspinal mm. bilateral, Middle Trapezius bilateral, Rhomboids bilateral, Pectoralis bilateral, Subscapularis bilateral, and Latissimus Dorsi bilateral  Lumbar Paraspinal mm. bilateral, Gluteal mm. Glute. Max.  and Glute. Med. bilateral, and Piriformis bilateral    Recommended follow-up in 1 month(s).     The patient tolerated today's treatment with little or no additional discomfort and was instructed to contact the office for questions or concerns.

## 2024-12-18 ENCOUNTER — APPOINTMENT (OUTPATIENT)
Dept: INTEGRATIVE MEDICINE | Facility: CLINIC | Age: 29
End: 2024-12-18
Payer: COMMERCIAL

## 2024-12-18 DIAGNOSIS — M54.2 NECK PAIN, CHRONIC: ICD-10-CM

## 2024-12-18 DIAGNOSIS — M54.6 CHRONIC MIDLINE THORACIC BACK PAIN: ICD-10-CM

## 2024-12-18 DIAGNOSIS — G89.29 CHRONIC MIDLINE THORACIC BACK PAIN: ICD-10-CM

## 2024-12-18 DIAGNOSIS — G89.29 NECK PAIN, CHRONIC: ICD-10-CM

## 2024-12-18 DIAGNOSIS — M99.02 SEGMENTAL AND SOMATIC DYSFUNCTION OF THORACIC REGION: Primary | ICD-10-CM

## 2024-12-18 DIAGNOSIS — M54.81 OCCIPITAL NEURITIS: ICD-10-CM

## 2024-12-18 DIAGNOSIS — M99.05 SEGMENTAL AND SOMATIC DYSFUNCTION OF PELVIC REGION: ICD-10-CM

## 2024-12-18 DIAGNOSIS — M99.03 SEGMENTAL AND SOMATIC DYSFUNCTION OF LUMBAR REGION: ICD-10-CM

## 2024-12-18 DIAGNOSIS — M79.10 MYALGIA: ICD-10-CM

## 2024-12-18 PROCEDURE — 97112 NEUROMUSCULAR REEDUCATION: CPT | Performed by: CHIROPRACTOR

## 2024-12-18 PROCEDURE — 98941 CHIROPRACT MANJ 3-4 REGIONS: CPT | Performed by: CHIROPRACTOR

## 2024-12-18 NOTE — PROGRESS NOTES
Subjective   Patient ID: Patrick Rey is a 29 y.o. male who presents December 18, 2024 for chiropractic care.    (2/20) VPCY    Today, the patient rates their degree of pain as a 2 out of 10 on the numeric pain rating scale.     Patrick returns for continued chiropractic care. Today, he reports that he has been experiencing a higher frequency of occipital neuralgia episodes. He also states that his right shoulder, although improved, continues to bother him. The patient denies any changes in health since his last encounter and will return as scheduled.      Objective   Physical Exam  Neurological:      General: No focal deficit present.      Mental Status: He is alert and oriented to person, place, and time.      Cranial Nerves: No dysarthria or facial asymmetry.      Sensory: Sensation is intact.      Motor: Motor function is intact.      Coordination: Coordination is intact.      Gait: Gait is intact.       Palpation of the following regions revealed:  Cervical: Suboccipitals bilateral, muscular hypertonicity.  Scalenes bilateral, muscular hypertonicity.  Upper trapezius bilateral, muscular hypertonicity.  Levator scapulae bilateral, muscular hypertonicity.  Cervical paraspinals bilateral, muscular hypertonicity.  Thoracic: Thoracic paraspinals bilateral, muscular hypertonicity.  Middle trapezius bilateral, muscular hypertonicity.  Rhomboids bilateral, muscular hypertonicity.  Pectoralis bilateral, muscular hypertonicity.  Lumbar: Lumbar paraspinals bilateral, muscular hypertonicity.  Gluteal bilateral, muscular hypertonicity.    Segmental Joint(s): Segmental joint dysfunction was assessed with motion palpation and is identified in the following areas:  Cervical : C1 C2 C6  Thoracic : T4, T5, T6, and T8  Lumbopelvic / Sacral SIJ : L2, L5/S1, and R SIJ    Assessment/Plan   Today's Treatment Included: Spinal manipulation to the following regions of segmental dysfunction identified on examination, using  age-appropriate and injury specific force. Segmental Joint(s) Cervical : C1 C2 C6 Segmental Joint(s) Thoracic : T2., T5, T6, and T8 Segmental Joint(s) Lumbopelvic/Sacral SIJ : L2, L5/S1, R SIJ, and L SIJ  Chiropractic manipulation treatment techniques utilized: Diversified CMT, Pelvic drop table technique, and Pelvic blocking technique  Soft tissue mobilization techniques utilized during treatment: Pin and Stretch and Manual Dynamic Stretching  Integrative Dry Needling (IDN) - Needles in/out:  17.  Neuromuscular Re-Education (77867): 2 Units; Start time: 8:45a  End time: 9:15a      Soft-tissue mobilization was performed in the following areas:  Suboccipital bilateral, Cervical paraspinal mm. bilateral, Scalenes bilateral, Upper Trapezius right, and Levator Scap. right  Thoracic Paraspinal mm. right, Middle Trapezius right, Rhomboids right, Pectoralis right, Subscapularis right, and Latissimus Dorsi right    Recommended follow-up in 1 month(s).     The patient tolerated today's treatment with little or no additional discomfort and was instructed to contact the office for questions or concerns.

## 2024-12-30 NOTE — PROGRESS NOTES
Physical Therapy  Physical Therapy Orthopedic Evaluation    Patient Name: Patrick Rey  MRN: [id number]  Today's Date: 12/31/2024  Time Calculation  Start Time: 0710  Stop Time: 0820  Time Calculation (min): 70 min    Insurance:  Visit number: 1 of 30  Authorization info: auth after eval  Insurance Type:  Employee    General:  Reason for visit: B patellar tendinosis; L Achilles tendinosis  Referred by: Dr. Dang    Current Problem:  1. Chronic pain of both knees  Follow Up In Physical Therapy      2. Left ankle pain  Follow Up In Physical Therapy      3. Acute left ankle pain [M25.572]  Follow Up In Physical Therapy          Precautions: None  Precautions  STEADI Fall Risk Score (The score of 4 or more indicates an increased risk of falling): 0      Medical History Form: Reviewed (scanned into chart)    Subjective:     Chief Complaint: Patient presents to clinic with a chief complaint of B anterior knee pain and L posterior ankle pain.  Onset Date: 12/1/24 current aggravation, pain present over past 2 years  PATY: Chronic and Overuse (work)    Current Condition:   Same    Pain:  Pain Assessment: 0-10  0-10 (Numeric) Pain Score: 2  Location: B anterior knee, L posterior ankle  Description: Sharp, sore  Aggravating Factors: Walking and Squatting  Relieving Factors:  Rest    Relevant Information (PMH & Previous Tests/Imaging): None  Previous Interventions/Treatments: None    Prior Level of Function (PLOF)  Patient previously independent with all ADLs  Exercise/Physical Activity: Lifts at gym  Work/School:      Patients Living Environment: Reviewed and no concern    Primary Language: English    There are no spiritual/cultural practices/values/needs that are important to know    Patient's Goal(s) for Therapy: To reduce pain and prevent pain from happening in the future    Red Flags: Do you have any of the following? No  Fever/chills, unexplained weight changes, dizziness/fainting, unexplained change in  "bowel or bladder functions, unexplained malaise or muscle weakness, night pain/sweats, numbness or tingling    Objective:  Objective       ROM       Knee AROM (Degrees)      (R)  (L)  Flexion: WNL  WNL   Extension: WNL  WNL          Ankle AROM (Degrees)      (R)  (L)  Plantarflexion: 60  60    Dorsiflexion: 7  0    Inversion: 30  30     Eversion: 12  12               Strength Testing    Hip    (R)  (L)  Flexion: 4+  4+     Extension: 4+  4+    Abduction: 4+  4+    Adduction: 5  5        Knee    (R)  (L)  Flexion: 5  5     Extension: 5  5        Ankle    (R)  (L)  Plantarflexion: 5  5      Dorsiflexion: 5  5    Inversion: 5  5     Eversion: 5  5         Palpation: TTP B patellar tendon insertion      Ankle/Foot Joint Mobility: Mildly restricted L talar glides        Functional Screening  Squat: Mild L pronation with decreased DF  Lunge: WNL  Lateral Step Down: Mild knee valgus  SL Balance: WNL  SL Quarter Squat: Mild knee valgus      Outcome Measures:  Other Measures  Lower Extremity Funtional Score (LEFS): 70/80       EDUCATION: Home exercise program, plan of care, activity modifications, pain management, and injury pathology       Goals: Set and discussed today      Plan of care was developed with input and agreement by the patient    Treatment Performed:    Therapeutic Exercise:    35 min  SHR ISO at wall 5x45\" H with 90\" rest  KE ISO at 90 deg 5x45\" H with 90\" rest  Coleman of shockwave to B patellar tendons and L Achilles    Other:      min      PT Evaluation Time Entry  PT Evaluation (Low) Time Entry: 30  PT Therapeutic Procedures Time Entry  Therapeutic Exercise Time Entry: 35                      Assessment: Patient presents with signs and symptoms consistent with B patellar and L Achilles tendon pain, resulting in limited participation in pain-free ADLs and inability to perform at their prior level of function. Pt would benefit from physical therapy to address the impairments found & listed previously in the " objective section in order to return to safe and pain-free ADLs and prior level of function.       Clinical Presentation: Stable and/or uncomplicated characteristics    Plan:     Planned Interventions include: therapeutic exercise, self-care home management, manual therapy, therapeutic activities, gait training, neuromuscular coordination, vasopneumatic, dry needling, aquatic therapy  Frequency: 1 x Week  Duration: 8 Weeks  Rehab Potential/Prognosis: Excellent    Elida Alvarenga, PT

## 2024-12-31 ENCOUNTER — EVALUATION (OUTPATIENT)
Dept: PHYSICAL THERAPY | Facility: HOSPITAL | Age: 29
End: 2024-12-31
Payer: COMMERCIAL

## 2024-12-31 DIAGNOSIS — M25.572 ACUTE LEFT ANKLE PAIN: ICD-10-CM

## 2024-12-31 DIAGNOSIS — G89.29 CHRONIC PAIN OF BOTH KNEES: Primary | ICD-10-CM

## 2024-12-31 DIAGNOSIS — M25.572 LEFT ANKLE PAIN: ICD-10-CM

## 2024-12-31 DIAGNOSIS — M25.562 CHRONIC PAIN OF BOTH KNEES: Primary | ICD-10-CM

## 2024-12-31 DIAGNOSIS — M25.561 CHRONIC PAIN OF BOTH KNEES: Primary | ICD-10-CM

## 2024-12-31 PROCEDURE — 97161 PT EVAL LOW COMPLEX 20 MIN: CPT | Mod: GP | Performed by: PHYSICAL THERAPIST

## 2024-12-31 PROCEDURE — 97110 THERAPEUTIC EXERCISES: CPT | Mod: GP | Performed by: PHYSICAL THERAPIST

## 2024-12-31 ASSESSMENT — PAIN - FUNCTIONAL ASSESSMENT: PAIN_FUNCTIONAL_ASSESSMENT: 0-10

## 2024-12-31 ASSESSMENT — PAIN SCALES - GENERAL: PAINLEVEL_OUTOF10: 2

## 2025-01-06 ENCOUNTER — TREATMENT (OUTPATIENT)
Dept: PHYSICAL THERAPY | Facility: HOSPITAL | Age: 30
End: 2025-01-06
Payer: COMMERCIAL

## 2025-01-06 DIAGNOSIS — M25.572 ACUTE LEFT ANKLE PAIN: ICD-10-CM

## 2025-01-06 DIAGNOSIS — M25.562 CHRONIC PAIN OF BOTH KNEES: Primary | ICD-10-CM

## 2025-01-06 DIAGNOSIS — G89.29 CHRONIC PAIN OF BOTH KNEES: Primary | ICD-10-CM

## 2025-01-06 DIAGNOSIS — M25.561 CHRONIC PAIN OF BOTH KNEES: Primary | ICD-10-CM

## 2025-01-06 PROCEDURE — 97110 THERAPEUTIC EXERCISES: CPT | Mod: GP | Performed by: PHYSICAL THERAPIST

## 2025-01-06 PROCEDURE — 97140 MANUAL THERAPY 1/> REGIONS: CPT | Mod: GP | Performed by: PHYSICAL THERAPIST

## 2025-01-06 ASSESSMENT — PAIN - FUNCTIONAL ASSESSMENT: PAIN_FUNCTIONAL_ASSESSMENT: 0-10

## 2025-01-06 ASSESSMENT — PAIN SCALES - GENERAL: PAINLEVEL_OUTOF10: 2

## 2025-01-06 NOTE — PROGRESS NOTES
"  Physical Therapy  Physical Therapy Treatment Note    Patient Name: Patrick Rey  MRN: [id number]  Today's Date: 1/6/2025  Time Calculation  Start Time: 0815  Stop Time: 0920  Time Calculation (min): 65 min    Insurance:  Visit number: 2 of 30  Authorization info: auth after eval  Insurance Type:  Employee    General:  Reason for visit: B patellar tendinosis; L Achilles tendinosis  Referred by: Dr. Dang    Current Problem  1. Chronic pain of both knees        2. Acute left ankle pain            Precautions: None      Subjective:   Patient reports that he has been battling a sickness since the new year but overall has been ok.  Has continued having pain in knees and L ankle.    Pain  Pain Assessment: 0-10  0-10 (Numeric) Pain Score: 2    Performing HEP?: Yes      Objective:   Ankle AROM (Degrees)                             (R)                    (L)  Plantarflexion:  60                    60                       Dorsiflexion:     7                      0                        Inversion:         30                     30                                   Eversion:          12                     12                                                                                            Strength Testing     Hip                          (R)                    (L)  Flexion:            4+                    4+                                  Extension:        4+                    4+                      Abduction:       4+                    4+                      Adduction:       5                      5        Treatment Performed:    Therapeutic Exercise:    50 min  SHR ISO at wall 5x45\" H with 90\" rest  KE ISO at 90 deg 5x45\" H with 90\" rest  Jump  level 7 x 1 band DP / DHR 3x15 each  4\" step single eccentric calf raise 3x8 L  4\" SL lateral heel tap 3x8 each leg    Manual Therapy:    10 min  IASTM B patellar tendon and L Achilles tendon    Other:      min  Trial of shockwave to B patellar " tendons and L Achilles                              Assessment:   Patrick Rey is progressing towards their goals as evidenced by compliance to HEP, dminished tenderness in B patellar tendons, and improving lower extremity strength.  The focus of the session was Strengthening and soft tissue massage. The pt demonstrated Good tolerance to the noted exercises today. The pt is demonstrated Good progress in skilled rehab at this time. The pt is still limited in overall Strength, Motor control, and Pain at this time.   Patient would continue to benefit from skilled PT to address remaining functional, objective and subjective deficits to allow them to return to full independence with ADLs and iADLs.        Plan:  Cont with tendon loading progression      Elida Alvarenga, PT

## 2025-01-13 ENCOUNTER — TREATMENT (OUTPATIENT)
Dept: PHYSICAL THERAPY | Facility: HOSPITAL | Age: 30
End: 2025-01-13
Payer: COMMERCIAL

## 2025-01-13 DIAGNOSIS — M25.572 ACUTE LEFT ANKLE PAIN: ICD-10-CM

## 2025-01-13 DIAGNOSIS — M25.572 LEFT ANKLE PAIN: ICD-10-CM

## 2025-01-13 DIAGNOSIS — G89.29 CHRONIC PAIN OF BOTH KNEES: ICD-10-CM

## 2025-01-13 DIAGNOSIS — M25.562 CHRONIC PAIN OF BOTH KNEES: ICD-10-CM

## 2025-01-13 DIAGNOSIS — M25.561 CHRONIC PAIN OF BOTH KNEES: ICD-10-CM

## 2025-01-13 PROCEDURE — 97110 THERAPEUTIC EXERCISES: CPT | Mod: GP | Performed by: PHYSICAL THERAPIST

## 2025-01-13 ASSESSMENT — PAIN SCALES - GENERAL: PAINLEVEL_OUTOF10: 2

## 2025-01-13 ASSESSMENT — PAIN - FUNCTIONAL ASSESSMENT: PAIN_FUNCTIONAL_ASSESSMENT: 0-10

## 2025-01-13 NOTE — PROGRESS NOTES
"  Physical Therapy  Physical Therapy Treatment Note    Patient Name: Patrick Rey  MRN: [id number]  Today's Date: 1/13/2025  Time Calculation  Start Time: 0810  Stop Time: 0910  Time Calculation (min): 60 min    Insurance:  Visit number: 3 of 30  Authorization info: auth after eval  Insurance Type:  Employee    General:  Reason for visit: B patellar tendinosis; L Achilles tendinosis  Referred by: Dr. Dang    Current Problem  1. Chronic pain of both knees  Follow Up In Physical Therapy      2. Left ankle pain  Follow Up In Physical Therapy      3. Acute left ankle pain [M25.572]  Follow Up In Physical Therapy            Precautions: None      Subjective:   Patient reports that he is feeling alright, however his R knee was feeling uncomfortable this morning.    Pain  Pain Assessment: 0-10  0-10 (Numeric) Pain Score: 2    Performing HEP?: Yes      Objective:   Ankle AROM (Degrees)                             (R)                    (L)  Plantarflexion:  60                    60                       Dorsiflexion:     7                      0                        Inversion:         30                     30                                   Eversion:          12                     12                                                                                            Strength Testing     Hip                          (R)                    (L)  Flexion:            4+                    4+                                  Extension:        4+                    4+                      Abduction:       4+                    4+                      Adduction:       5                      5        Treatment Performed:    Therapeutic Exercise:    50 min  Upright bike x 5 mins  [place] lateral stepping 3x5 yards  SHR ISO at wall 5x45\" H with 90\" rest  KE ISO at 90 deg 5x45\" H with 90\" rest  Jump  level 7 x 1 band DP / DHR 3x15 each  12\" step up and down 3x10  4\" step single eccentric calf raise 3x8 " "L  6\" SL lateral heel tap 3x8 each leg    Manual Therapy:    NOT TODAY  IASTM B patellar tendon and L Achilles tendon    Other:      min  Trial of shockwave to B patellar tendons and L Achilles       PT Therapeutic Procedures Time Entry  Therapeutic Exercise Time Entry: 50                      Assessment:   Patrick Rey is progressing towards their goals as evidenced by compliance to HEP, dminished tenderness in B patellar tendons, and improving lower extremity strength.  The focus of the session was Strengthening and soft tissue massage. The pt demonstrated Good tolerance to the noted exercises today. The pt is demonstrated Good progress in skilled rehab at this time. The pt is still limited in overall Strength, Motor control, and Pain at this time.   Patient would continue to benefit from skilled PT to address remaining functional, objective and subjective deficits to allow them to return to full independence with ADLs and iADLs.        Plan:  Cont with tendon loading progression      Elida Alvarenga, PT   "

## 2025-01-15 ENCOUNTER — APPOINTMENT (OUTPATIENT)
Dept: INTEGRATIVE MEDICINE | Facility: CLINIC | Age: 30
End: 2025-01-15
Payer: COMMERCIAL

## 2025-01-16 ENCOUNTER — APPOINTMENT (OUTPATIENT)
Dept: INTEGRATIVE MEDICINE | Facility: CLINIC | Age: 30
End: 2025-01-16
Payer: COMMERCIAL

## 2025-01-23 ENCOUNTER — APPOINTMENT (OUTPATIENT)
Dept: INTEGRATIVE MEDICINE | Facility: CLINIC | Age: 30
End: 2025-01-23
Payer: COMMERCIAL

## 2025-01-23 ENCOUNTER — APPOINTMENT (OUTPATIENT)
Dept: PHYSICAL THERAPY | Facility: HOSPITAL | Age: 30
End: 2025-01-23
Payer: COMMERCIAL

## 2025-01-23 DIAGNOSIS — G89.29 NECK PAIN, CHRONIC: ICD-10-CM

## 2025-01-23 DIAGNOSIS — M54.2 NECK PAIN, CHRONIC: ICD-10-CM

## 2025-01-23 DIAGNOSIS — G89.29 CHRONIC MIDLINE THORACIC BACK PAIN: ICD-10-CM

## 2025-01-23 DIAGNOSIS — M99.05 SEGMENTAL AND SOMATIC DYSFUNCTION OF PELVIC REGION: ICD-10-CM

## 2025-01-23 DIAGNOSIS — M99.02 SEGMENTAL AND SOMATIC DYSFUNCTION OF THORACIC REGION: Primary | ICD-10-CM

## 2025-01-23 DIAGNOSIS — M54.6 CHRONIC MIDLINE THORACIC BACK PAIN: ICD-10-CM

## 2025-01-23 DIAGNOSIS — M99.03 SEGMENTAL AND SOMATIC DYSFUNCTION OF LUMBAR REGION: ICD-10-CM

## 2025-01-23 DIAGNOSIS — M79.10 MYALGIA: ICD-10-CM

## 2025-01-23 DIAGNOSIS — M54.81 OCCIPITAL NEURITIS: ICD-10-CM

## 2025-01-23 PROCEDURE — 98941 CHIROPRACT MANJ 3-4 REGIONS: CPT | Performed by: CHIROPRACTOR

## 2025-01-23 PROCEDURE — 97112 NEUROMUSCULAR REEDUCATION: CPT | Performed by: CHIROPRACTOR

## 2025-01-23 NOTE — PROGRESS NOTES
Subjective   Patient ID: Patrick Rey is a 29 y.o. male who presents January 23, 2025 for chiropractic care.    (1/20) [facility]    Today, the patient rates their degree of pain as a 2 out of 10 on the numeric pain rating scale.     Patrick returns for continued chiropractic care. Today, he presents with generalized soreness throughout the mid to lower back. He states that work continues to go well. He also reports some shoulder discomfort this week, in addition to increased occipital neuralgia symptoms. The patient denies any changes in health since his last encounter and will return as scheduled.      Objective   Physical Exam  Neurological:      General: No focal deficit present.      Mental Status: He is alert and oriented to person, place, and time.      Cranial Nerves: No dysarthria or facial asymmetry.      Sensory: Sensation is intact.      Motor: Motor function is intact.      Coordination: Coordination is intact.      Gait: Gait is intact.     Palpation of the following regions revealed:  Cervical: Suboccipitals bilateral, muscular hypertonicity.  Scalenes bilateral, muscular hypertonicity.  Upper trapezius bilateral, muscular hypertonicity.  Levator scapulae bilateral, muscular hypertonicity.  Cervical paraspinals bilateral, muscular hypertonicity.  Thoracic: Thoracic paraspinals bilateral, muscular hypertonicity.  Middle trapezius bilateral, muscular hypertonicity.  Rhomboids bilateral, muscular hypertonicity.  Pectoralis bilateral, muscular hypertonicity.  Lumbar: Lumbar paraspinals bilateral, muscular hypertonicity.  Gluteal bilateral, muscular hypertonicity.    Segmental Joint(s): Segmental joint dysfunction was assessed with motion palpation and is identified in the following areas:  Cervical : C1 C2 C6  Thoracic : T4, T5, T6, and T8  Lumbopelvic / Sacral SIJ : L2, L5/S1, and R SIJ    Assessment/Plan   Today's Treatment Included: Spinal manipulation to the following regions of segmental dysfunction  identified on examination, using age-appropriate and injury specific force. Segmental Joint(s) Cervical : C1 C2 C6 Segmental Joint(s) Thoracic : T2., T5, T6, and T8 Segmental Joint(s) Lumbopelvic/Sacral SIJ : L2, L5/S1, R SIJ, and L SIJ  Chiropractic manipulation treatment techniques utilized: Diversified CMT, Pelvic drop table technique, and Pelvic blocking technique  Soft tissue mobilization techniques utilized during treatment: Pin and Stretch and Manual Dynamic Stretching  Integrative Dry Needling (IDN) - Needles in/out:  17.  Neuromuscular Re-Education (97478): 2 Units; Start time: 10:00a  End time: 10:30a      Soft-tissue mobilization was performed in the following areas:  Suboccipital bilateral, Cervical paraspinal mm. bilateral, Scalenes bilateral, Upper Trapezius right, and Levator Scap. right  Thoracic Paraspinal mm. right, Middle Trapezius right, Rhomboids right, Pectoralis right, Subscapularis right, and Latissimus Dorsi right    Recommended follow-up in 1 month(s).     The patient tolerated today's treatment with little or no additional discomfort and was instructed to contact the office for questions or concerns.

## 2025-03-27 ENCOUNTER — APPOINTMENT (OUTPATIENT)
Dept: INTEGRATIVE MEDICINE | Facility: CLINIC | Age: 30
End: 2025-03-27
Payer: COMMERCIAL

## 2025-04-30 ENCOUNTER — OFFICE VISIT (OUTPATIENT)
Dept: URGENT CARE | Age: 30
End: 2025-04-30
Payer: COMMERCIAL

## 2025-04-30 VITALS
DIASTOLIC BLOOD PRESSURE: 85 MMHG | RESPIRATION RATE: 20 BRPM | HEART RATE: 85 BPM | OXYGEN SATURATION: 99 % | TEMPERATURE: 98.4 F | SYSTOLIC BLOOD PRESSURE: 121 MMHG

## 2025-04-30 DIAGNOSIS — J02.9 SORE THROAT: ICD-10-CM

## 2025-04-30 LAB
POC RAPID MONO: NEGATIVE
POC RAPID STREP: NEGATIVE

## 2025-04-30 PROCEDURE — 87880 STREP A ASSAY W/OPTIC: CPT | Performed by: NURSE PRACTITIONER

## 2025-04-30 PROCEDURE — 99203 OFFICE O/P NEW LOW 30 MIN: CPT | Performed by: NURSE PRACTITIONER

## 2025-04-30 PROCEDURE — 86308 HETEROPHILE ANTIBODY SCREEN: CPT | Performed by: NURSE PRACTITIONER

## 2025-04-30 ASSESSMENT — ENCOUNTER SYMPTOMS
SHORTNESS OF BREATH: 0
DIARRHEA: 0
NECK PAIN: 0
SWOLLEN GLANDS: 0
HEADACHES: 0
STRIDOR: 0
VOMITING: 0
ABDOMINAL PAIN: 0
TROUBLE SWALLOWING: 0
SORE THROAT: 1
HOARSE VOICE: 0
COUGH: 0

## 2025-04-30 NOTE — PROGRESS NOTES
Subjective   Patient ID: Patrick Rey is a 30 y.o. male. They present today with a chief complaint of Sore Throat (Seen two weeks ago at [facility] clinic, strep neg. Continues to have sore throat).    History of Present Illness    History provided by:  Patient   used: No    Sore Throat   This is a new problem. Episode onset: 3 days. The problem has been unchanged. Neither side of throat is experiencing more pain than the other. There has been no fever. The pain is moderate. Pertinent negatives include no abdominal pain, congestion, coughing, diarrhea, drooling, ear discharge, ear pain, headaches, hoarse voice, plugged ear sensation, neck pain, shortness of breath, stridor, swollen glands, trouble swallowing or vomiting. Associated symptoms comments: Pt denies itchy nose, itchy throat, itchy eyes and frequent sneezing. . He has had no exposure to strep or mono. Treatments tried: antihistamine. The treatment provided no relief.       Past Medical History  Allergies as of 04/30/2025 - Reviewed 04/30/2025   Allergen Reaction Noted    Ragweed Unknown 09/30/2023       Prescriptions Prior to Admission[1]     Medical History[2]    Surgical History[3]     reports that he has never smoked. He has never used smokeless tobacco. He reports current alcohol use. He reports that he does not currently use drugs.    Review of Systems  Review of Systems   HENT:  Positive for sore throat. Negative for congestion, drooling, ear discharge, ear pain, hoarse voice and trouble swallowing.    Respiratory:  Negative for cough, shortness of breath and stridor.    Gastrointestinal:  Negative for abdominal pain, diarrhea and vomiting.   Musculoskeletal:  Negative for neck pain.   Neurological:  Negative for headaches.         Objective    Vitals:    04/30/25 1034   BP: 121/85   Pulse: 85   Resp: 20   Temp: 36.9 °C (98.4 °F)   SpO2: 99%     No LMP for male patient.    Physical Exam  Vitals and nursing note reviewed.    Constitutional:       Appearance: Normal appearance.   HENT:      Head: Normocephalic and atraumatic.      Right Ear: Hearing, tympanic membrane, ear canal and external ear normal.      Left Ear: Hearing, tympanic membrane, ear canal and external ear normal.      Nose: Mucosal edema and congestion present. No nasal deformity, septal deviation, signs of injury, laceration, nasal tenderness or rhinorrhea.      Right Sinus: No maxillary sinus tenderness or frontal sinus tenderness.      Left Sinus: No maxillary sinus tenderness or frontal sinus tenderness.      Mouth/Throat:      Lips: Pink.      Mouth: Mucous membranes are moist.      Pharynx: Oropharynx is clear. Uvula midline.      Tonsils: No tonsillar exudate or tonsillar abscesses.   Cardiovascular:      Rate and Rhythm: Normal rate and regular rhythm.      Heart sounds: Normal heart sounds.   Pulmonary:      Effort: Pulmonary effort is normal.      Breath sounds: Normal breath sounds and air entry.   Musculoskeletal:      Cervical back: Normal range of motion and neck supple.   Lymphadenopathy:      Cervical: No cervical adenopathy.   Neurological:      Mental Status: He is alert.   Psychiatric:         Mood and Affect: Mood normal.         Behavior: Behavior normal.         Procedures    Point of Care Test & Imaging Results from this visit  Results for orders placed or performed in visit on 04/30/25   POCT Infectious mononucleosis antibody manually resulted   Result Value Ref Range    POC Rapid Mono Negative Negative   POCT rapid strep A manually resulted   Result Value Ref Range    POC Rapid Strep Negative Negative      Imaging  No results found.    Cardiology, Vascular, and Other Imaging  No other imaging results found for the past 2 days      Diagnostic study results (if any) were reviewed by JESSIE Delgado.    Assessment/Plan   Allergies, medications, history, and pertinent labs/EKGs/Imaging reviewed by JESSIE Delgado.     Medical Decision  Making  Negative strep test.  Supportive care of viral URI discussed.  Symptoms should improve in 7 to 10 days.  Advised of switching 2nd generation antihistamines every 6 months as using one antihistamine over an extended period of time could make the antihistamine lose it efficacy.  Increase fluid intake and rest as needed.  Take Tylenol and/or ibuprofen as needed for aches and pain and/or fever.  Return or follow-up with primary care provider if symptoms did not improve and/or pt developed purulent nasal discharge, worsening cough, fever, worsening sore throat, ear pain, sinus pain and headaches.  Call 911 or go to the nearest emergency room if symptoms became severe such as severe pain, shortness of breath, chest tight ness.   Patient verbalized understanding of the instructions and left in stable condition.        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

## 2025-06-09 ENCOUNTER — APPOINTMENT (OUTPATIENT)
Facility: CLINIC | Age: 30
End: 2025-06-09
Payer: COMMERCIAL